# Patient Record
Sex: FEMALE | Race: WHITE | URBAN - METROPOLITAN AREA
[De-identification: names, ages, dates, MRNs, and addresses within clinical notes are randomized per-mention and may not be internally consistent; named-entity substitution may affect disease eponyms.]

---

## 2022-05-27 ENCOUNTER — INPATIENT (INPATIENT)
Facility: HOSPITAL | Age: 62
LOS: 4 days | Discharge: HOME | DRG: 312 | End: 2022-06-01
Attending: HOSPITALIST | Admitting: INTERNAL MEDICINE
Payer: COMMERCIAL

## 2022-05-27 VITALS — WEIGHT: 145.06 LBS | HEIGHT: 66 IN

## 2022-05-27 DIAGNOSIS — N39.0 URINARY TRACT INFECTION, SITE NOT SPECIFIED: ICD-10-CM

## 2022-05-27 DIAGNOSIS — I95.9 HYPOTENSION, UNSPECIFIED: ICD-10-CM

## 2022-05-27 LAB
ALBUMIN SERPL ELPH-MCNC: 3.8 G/DL — SIGNIFICANT CHANGE UP (ref 3.3–5)
ALP SERPL-CCNC: 53 U/L — SIGNIFICANT CHANGE UP (ref 40–120)
ALT FLD-CCNC: 12 U/L — SIGNIFICANT CHANGE UP (ref 12–78)
ANION GAP SERPL CALC-SCNC: 8 MMOL/L — SIGNIFICANT CHANGE UP (ref 5–17)
APPEARANCE UR: CLEAR — SIGNIFICANT CHANGE UP
AST SERPL-CCNC: 12 U/L — LOW (ref 15–37)
BASOPHILS # BLD AUTO: 0.02 K/UL — SIGNIFICANT CHANGE UP (ref 0–0.2)
BASOPHILS NFR BLD AUTO: 0.3 % — SIGNIFICANT CHANGE UP (ref 0–2)
BILIRUB SERPL-MCNC: 0.5 MG/DL — SIGNIFICANT CHANGE UP (ref 0.2–1.2)
BILIRUB UR-MCNC: NEGATIVE — SIGNIFICANT CHANGE UP
BUN SERPL-MCNC: 11 MG/DL — SIGNIFICANT CHANGE UP (ref 7–23)
CALCIUM SERPL-MCNC: 9.1 MG/DL — SIGNIFICANT CHANGE UP (ref 8.5–10.1)
CHLORIDE SERPL-SCNC: 102 MMOL/L — SIGNIFICANT CHANGE UP (ref 96–108)
CO2 SERPL-SCNC: 27 MMOL/L — SIGNIFICANT CHANGE UP (ref 22–31)
COLOR SPEC: YELLOW — SIGNIFICANT CHANGE UP
CREAT SERPL-MCNC: 0.74 MG/DL — SIGNIFICANT CHANGE UP (ref 0.5–1.3)
DIFF PNL FLD: ABNORMAL
EGFR: 92 ML/MIN/1.73M2 — SIGNIFICANT CHANGE UP
EOSINOPHIL # BLD AUTO: 0 K/UL — SIGNIFICANT CHANGE UP (ref 0–0.5)
EOSINOPHIL NFR BLD AUTO: 0 % — SIGNIFICANT CHANGE UP (ref 0–6)
GLUCOSE SERPL-MCNC: 105 MG/DL — HIGH (ref 70–99)
GLUCOSE UR QL: NEGATIVE — SIGNIFICANT CHANGE UP
HCT VFR BLD CALC: 36 % — SIGNIFICANT CHANGE UP (ref 34.5–45)
HGB BLD-MCNC: 11.7 G/DL — SIGNIFICANT CHANGE UP (ref 11.5–15.5)
IMM GRANULOCYTES NFR BLD AUTO: 0.1 % — SIGNIFICANT CHANGE UP (ref 0–1.5)
KETONES UR-MCNC: ABNORMAL
LACTATE SERPL-SCNC: 1.2 MMOL/L — SIGNIFICANT CHANGE UP (ref 0.7–2)
LEUKOCYTE ESTERASE UR-ACNC: ABNORMAL
LYMPHOCYTES # BLD AUTO: 0.94 K/UL — LOW (ref 1–3.3)
LYMPHOCYTES # BLD AUTO: 12.6 % — LOW (ref 13–44)
MCHC RBC-ENTMCNC: 29.1 PG — SIGNIFICANT CHANGE UP (ref 27–34)
MCHC RBC-ENTMCNC: 32.5 GM/DL — SIGNIFICANT CHANGE UP (ref 32–36)
MCV RBC AUTO: 89.6 FL — SIGNIFICANT CHANGE UP (ref 80–100)
MONOCYTES # BLD AUTO: 0.68 K/UL — SIGNIFICANT CHANGE UP (ref 0–0.9)
MONOCYTES NFR BLD AUTO: 9.1 % — SIGNIFICANT CHANGE UP (ref 2–14)
NEUTROPHILS # BLD AUTO: 5.81 K/UL — SIGNIFICANT CHANGE UP (ref 1.8–7.4)
NEUTROPHILS NFR BLD AUTO: 77.9 % — HIGH (ref 43–77)
NITRITE UR-MCNC: NEGATIVE — SIGNIFICANT CHANGE UP
PH UR: 6 — SIGNIFICANT CHANGE UP (ref 5–8)
PLATELET # BLD AUTO: 202 K/UL — SIGNIFICANT CHANGE UP (ref 150–400)
POTASSIUM SERPL-MCNC: 3.9 MMOL/L — SIGNIFICANT CHANGE UP (ref 3.5–5.3)
POTASSIUM SERPL-SCNC: 3.9 MMOL/L — SIGNIFICANT CHANGE UP (ref 3.5–5.3)
PROT SERPL-MCNC: 7.3 GM/DL — SIGNIFICANT CHANGE UP (ref 6–8.3)
PROT UR-MCNC: NEGATIVE — SIGNIFICANT CHANGE UP
RAPID RVP RESULT: SIGNIFICANT CHANGE UP
RBC # BLD: 4.02 M/UL — SIGNIFICANT CHANGE UP (ref 3.8–5.2)
RBC # FLD: 12.8 % — SIGNIFICANT CHANGE UP (ref 10.3–14.5)
SARS-COV-2 RNA SPEC QL NAA+PROBE: SIGNIFICANT CHANGE UP
SODIUM SERPL-SCNC: 137 MMOL/L — SIGNIFICANT CHANGE UP (ref 135–145)
SP GR SPEC: 1 — LOW (ref 1.01–1.02)
TROPONIN I, HIGH SENSITIVITY RESULT: 5.29 NG/L — SIGNIFICANT CHANGE UP
UROBILINOGEN FLD QL: NEGATIVE — SIGNIFICANT CHANGE UP
WBC # BLD: 7.46 K/UL — SIGNIFICANT CHANGE UP (ref 3.8–10.5)
WBC # FLD AUTO: 7.46 K/UL — SIGNIFICANT CHANGE UP (ref 3.8–10.5)

## 2022-05-27 PROCEDURE — 36415 COLL VENOUS BLD VENIPUNCTURE: CPT

## 2022-05-27 PROCEDURE — 80048 BASIC METABOLIC PNL TOTAL CA: CPT

## 2022-05-27 PROCEDURE — 99285 EMERGENCY DEPT VISIT HI MDM: CPT

## 2022-05-27 PROCEDURE — 71045 X-RAY EXAM CHEST 1 VIEW: CPT | Mod: 26

## 2022-05-27 PROCEDURE — 93306 TTE W/DOPPLER COMPLETE: CPT

## 2022-05-27 PROCEDURE — 93010 ELECTROCARDIOGRAM REPORT: CPT

## 2022-05-27 PROCEDURE — 85027 COMPLETE CBC AUTOMATED: CPT

## 2022-05-27 PROCEDURE — 84484 ASSAY OF TROPONIN QUANT: CPT

## 2022-05-27 PROCEDURE — 86803 HEPATITIS C AB TEST: CPT

## 2022-05-27 PROCEDURE — 83735 ASSAY OF MAGNESIUM: CPT

## 2022-05-27 PROCEDURE — 84100 ASSAY OF PHOSPHORUS: CPT

## 2022-05-27 RX ORDER — ACETAMINOPHEN 500 MG
650 TABLET ORAL ONCE
Refills: 0 | Status: COMPLETED | OUTPATIENT
Start: 2022-05-27 | End: 2022-05-27

## 2022-05-27 RX ORDER — SODIUM CHLORIDE 9 MG/ML
1000 INJECTION INTRAMUSCULAR; INTRAVENOUS; SUBCUTANEOUS ONCE
Refills: 0 | Status: COMPLETED | OUTPATIENT
Start: 2022-05-27 | End: 2022-05-27

## 2022-05-27 RX ORDER — ACETAMINOPHEN 500 MG
650 TABLET ORAL EVERY 6 HOURS
Refills: 0 | Status: DISCONTINUED | OUTPATIENT
Start: 2022-05-27 | End: 2022-05-28

## 2022-05-27 RX ORDER — SUMATRIPTAN SUCCINATE 4 MG/.5ML
1 INJECTION, SOLUTION SUBCUTANEOUS
Qty: 0 | Refills: 0 | DISCHARGE

## 2022-05-27 RX ORDER — PIPERACILLIN AND TAZOBACTAM 4; .5 G/20ML; G/20ML
3.38 INJECTION, POWDER, LYOPHILIZED, FOR SOLUTION INTRAVENOUS ONCE
Refills: 0 | Status: COMPLETED | OUTPATIENT
Start: 2022-05-27 | End: 2022-05-27

## 2022-05-27 RX ORDER — LANOLIN ALCOHOL/MO/W.PET/CERES
0.5 CREAM (GRAM) TOPICAL
Qty: 0 | Refills: 0 | DISCHARGE

## 2022-05-27 RX ORDER — VANCOMYCIN HCL 1 G
750 VIAL (EA) INTRAVENOUS ONCE
Refills: 0 | Status: COMPLETED | OUTPATIENT
Start: 2022-05-27 | End: 2022-05-27

## 2022-05-27 RX ADMIN — SODIUM CHLORIDE 1000 MILLILITER(S): 9 INJECTION INTRAMUSCULAR; INTRAVENOUS; SUBCUTANEOUS at 19:51

## 2022-05-27 RX ADMIN — Medication 650 MILLIGRAM(S): at 21:43

## 2022-05-27 RX ADMIN — PIPERACILLIN AND TAZOBACTAM 200 GRAM(S): 4; .5 INJECTION, POWDER, LYOPHILIZED, FOR SOLUTION INTRAVENOUS at 21:39

## 2022-05-27 RX ADMIN — Medication 250 MILLIGRAM(S): at 22:13

## 2022-05-27 NOTE — ED STATDOCS - CLINICAL SUMMARY MEDICAL DECISION MAKING FREE TEXT BOX
Continue Flecainide, Metoprolol  Hold Coumadin for possible ERCP, EUS, Biopsy Here with likely UTI. Check basic labs, hydrate, get UA, treat fever, reeval.

## 2022-05-27 NOTE — ED STATDOCS - MUSCULOSKELETAL, MLM
range of motion is not limited and there is no muscle tenderness 5/5 Strength on flexion and extension of all lizeth. .

## 2022-05-27 NOTE — ED STATDOCS - ATTENDING APP SHARED VISIT CONTRIBUTION OF CARE
I Mauricio Contreras MD saw and examined the patient. MLP saw and examined the patient under my supervision. I discussed the care of the patient with MLP and agree with MLP's plan, assessment and care of the patient while in the ED.

## 2022-05-27 NOTE — ED STATDOCS - NS_ ATTENDINGSCRIBEDETAILS _ED_A_ED_FT
I Mauricio Contreras MD saw and examined the patient. Scribe documented for me and under my supervision. I have modified the scribe's documentation where necessary to reflect my history, physical exam and other relevant documentations pertinent to the care of the patient.

## 2022-05-27 NOTE — ED ADULT TRIAGE NOTE - CHIEF COMPLAINT QUOTE
pt presents to ed ambulatory for evaluation of vaginal pain and "rawness" , dysuria, and pressure when urinating. in addition, pt reports feeling fatigue and chills with syncopal episode this morning at work. pt has no other complaints

## 2022-05-27 NOTE — ED STATDOCS - NSICDXPASTSURGICALHX_GEN_ALL_CORE_FT
PAST SURGICAL HISTORY:  S/P appendectomy 1976    S/P  Section  and     S/P mastectomy, bilateral 2009 with silicone implants

## 2022-05-27 NOTE — ED ADULT NURSE NOTE - OBJECTIVE STATEMENT
60 y/o female pt. ambulatory to ED c/o syncope and hypotension today at work. pt. states this has happened once before in the past. pt. is now normotensive in triage. pt. also reports rash, pain and burning in the vaginal area. pt. reports this has been occurring over a week and has gotten progressively worse.

## 2022-05-27 NOTE — ED STATDOCS - PHYSICAL EXAMINATION
Vital signs as available reviewed.  General:  No acute distress.  Head:  Normocephalic, atraumatic.  Eyes:  Conjunctiva pink, no icterus.  Cardiovascular:  Regular rate, no obvious murmur.  Respiratory:  Clear to auscultation, good air entry bilaterally.  Abdomen:  Soft, non-tender, no CVA ttp   Musculoskeletal:  No obvious deformity.  Neurologic: Alert and oriented, moving all extremities.  Skin:  Warm and dry.

## 2022-05-27 NOTE — ED STATDOCS - NS ED ROS FT
Constitutional: fever, lightheadedness   Neurological: No reported acute headache.  Eyes: No reported new vision changes.   Ears, Nose, Mouth, Throat: No reported acute sore throat.  Cardiovascular: No reported current chest pain. +syncope   Respiratory: No reported new shortness of breath.  Gastrointestinal: No reported vomiting.  Genitourinary: +dysuria   Musculoskeletal: No reported acute extremity pain.  Integumentary (skin and/or breast): No reported new rash.

## 2022-05-27 NOTE — PHARMACOTHERAPY INTERVENTION NOTE - COMMENTS
Medication reconciliation completed.  Reviewed Medication list and confirmed med allergies with patient; confirmed with Dr. First Medtroy.

## 2022-05-27 NOTE — ED STATDOCS - NS ED ATTENDING STATEMENT MOD
This was a shared visit with the NATE. I reviewed and verified the documentation and independently performed the documented:

## 2022-05-27 NOTE — ED STATDOCS - CARE PLAN
1 Principal Discharge DX:	Pyelonephritis  Secondary Diagnosis:	Fever   Principal Discharge DX:	Hypotension  Secondary Diagnosis:	Fever

## 2022-05-27 NOTE — ED STATDOCS - NSICDXPASTMEDICALHX_GEN_ALL_CORE_FT
PAST MEDICAL HISTORY:  BRCA Gene Positive     Breast cancer     Lipoma of abdominal wall     Migraine headache

## 2022-05-27 NOTE — ED STATDOCS - PROGRESS NOTE DETAILS
pt here with c/c of burning with urination , urinary frequency and urgency and had a syncopal episode at work today, pt denies any loc, head trauma and states she did not eat all day, she was found to be hypotensive and that has now improved.  pt PE wnl plan: labs, ivfs, ekg, hcg and reeval. -Lorri Tapia PA-C pt here with c/c of burning with urination , urinary frequency and urgency and had a syncopal episode at work today, pt denies any loc, head trauma and states she did not eat all day, she was found to be hypotensive and that has now improved.  pt PE wnl plan: labs, ivfs, ekg, and reeval. -Lorri Tapia PA-C pt reeval and aware of results and agrees to admission, called hospitalist and left message awaiting call back for admission.  -Lorri Tapia PA-C

## 2022-05-27 NOTE — ED STATDOCS - OBJECTIVE STATEMENT
60 yo female w/ no pertinent PMHx presents to the ED c/o syncope. Pt states that she had low blood pressure this morning and has a syncopal episode. According to the , pt has had a similar episode in the past. Pt states that she felt like she knew she was going to synopsize. Denies CP, SOB, palpitations. Pt c/o dysuria. Denies vaginal discharge. Pt has fever, lightheadedness. Denies nausea. Allergic to Dilaudid. 62 yo female w/ no pertinent PMHx presents to the ED c/o syncope. Pt states that she had low blood pressure this morning and has a syncopal episode. According to the , pt has had a similar episode in the past. Pt states that she felt like she knew she was going to synopsize. Denies CP, SOB, palpitations. Pt c/o dysuria. Denies vaginal discharge. Pt has fever, lightheadedness. Denies nausea. Allergic to Dilaudid. No visual complaints. No saddle anesthesia. No fever or chills. No skin rash No neck stiffness. No vertigo. no IVDU, or ETOH abuse. No trauma. No vaginal bleeding or dc. No melena or hematochezia.

## 2022-05-28 PROCEDURE — 99222 1ST HOSP IP/OBS MODERATE 55: CPT

## 2022-05-28 RX ORDER — ENOXAPARIN SODIUM 100 MG/ML
40 INJECTION SUBCUTANEOUS EVERY 24 HOURS
Refills: 0 | Status: DISCONTINUED | OUTPATIENT
Start: 2022-05-28 | End: 2022-06-01

## 2022-05-28 RX ORDER — INFLUENZA VIRUS VACCINE 15; 15; 15; 15 UG/.5ML; UG/.5ML; UG/.5ML; UG/.5ML
0.5 SUSPENSION INTRAMUSCULAR ONCE
Refills: 0 | Status: DISCONTINUED | OUTPATIENT
Start: 2022-05-28 | End: 2022-06-01

## 2022-05-28 RX ORDER — ACETAMINOPHEN 500 MG
650 TABLET ORAL EVERY 6 HOURS
Refills: 0 | Status: DISCONTINUED | OUTPATIENT
Start: 2022-05-28 | End: 2022-05-30

## 2022-05-28 RX ORDER — PHENAZOPYRIDINE HCL 100 MG
200 TABLET ORAL THREE TIMES A DAY
Refills: 0 | Status: ACTIVE | OUTPATIENT
Start: 2022-05-28 | End: 2023-04-26

## 2022-05-28 RX ORDER — CEFTRIAXONE 500 MG/1
1000 INJECTION, POWDER, FOR SOLUTION INTRAMUSCULAR; INTRAVENOUS EVERY 24 HOURS
Refills: 0 | Status: COMPLETED | OUTPATIENT
Start: 2022-05-28 | End: 2022-05-30

## 2022-05-28 RX ADMIN — CEFTRIAXONE 100 MILLIGRAM(S): 500 INJECTION, POWDER, FOR SOLUTION INTRAMUSCULAR; INTRAVENOUS at 09:30

## 2022-05-28 RX ADMIN — Medication 1 APPLICATION(S): at 19:29

## 2022-05-28 RX ADMIN — Medication 650 MILLIGRAM(S): at 18:00

## 2022-05-28 RX ADMIN — ENOXAPARIN SODIUM 40 MILLIGRAM(S): 100 INJECTION SUBCUTANEOUS at 09:31

## 2022-05-28 RX ADMIN — Medication 200 MILLIGRAM(S): at 01:16

## 2022-05-28 RX ADMIN — Medication 650 MILLIGRAM(S): at 09:32

## 2022-05-28 NOTE — H&P ADULT - NSHPPHYSICALEXAM_GEN_ALL_CORE
Vitals:  T(F): 98.7 (28 May 2022 00:37), Max: 102.8 (27 May 2022 18:29)  HR: 71 (28 May 2022 00:37) (71 - 97)  BP: 101/54 (28 May 2022 00:37) (101/54 - 130/82)  RR: 18 (28 May 2022 00:37) (17 - 18)  SpO2: 99% (28 May 2022 00:37) (97% - 99%) Vitals:  T(F): 98.7 (28 May 2022 00:37), Max: 102.8 (27 May 2022 18:29)  HR: 71 (28 May 2022 00:37) (71 - 97)  BP: 101/54 (28 May 2022 00:37) (101/54 - 130/82)  RR: 18 (28 May 2022 00:37) (17 - 18)  SpO2: 99% (28 May 2022 00:37) (97% - 99%)    Gen: No acute distress  HEENT: NCAT PERRL EOMI clear oropharynx  Neck: Supple, no JVD  Chest: Normal resp effort at rest, lungs CTA B/L  CVS: S1 S2 normal RRR  Abd: Soft NT ND +BS  Ext: No edema or calf tenderness  Skin: Warm, dry, intact  Neuro: AOx3, no gross deficits  Mood: Calm, pleasant Vitals:  T(F): 98.7 (28 May 2022 00:37), Max: 102.8 (27 May 2022 18:29)  HR: 71 (28 May 2022 00:37) (71 - 97)  BP: 101/54 (28 May 2022 00:37) (101/54 - 130/82)  RR: 18 (28 May 2022 00:37) (17 - 18)  SpO2: 99% (28 May 2022 00:37) (97% - 99%)    Gen: No acute distress  HEENT: NCAT PERRL EOMI clear oropharynx  Neck: Supple, no JVD  Chest: Normal resp effort at rest, lungs CTA B/L  CVS: S1 S2 normal RRR  Abd: Soft NT ND +BS  GYN exam: (As per GYN Consult from this afternoon) External genitalia w/ coalescing hypopigmented papules on the external labial bilaterally with similar hypopigmented papules on the bilateral inner thighs. Exquisite tenderness on digital exam, unable to advance pass the introitus 2/2 pain / atrophy. No unusual discharge or vaginal bleeding noted   Ext: No edema or calf tenderness  Skin: Warm, dry, intact  Neuro: AOx3, no gross deficits  Mood: Calm, pleasant

## 2022-05-28 NOTE — H&P ADULT - HISTORY OF PRESENT ILLNESS
Chief Complaint: Dysuria, episode of syncope    HPI:  Chief Complaint: Dysuria, episode of syncope    HPI: 61 year old woman with hx of migraine headaches, breast cancer, BRCA mutation, here for further evaluation after an episode of syncope, also requesting evaluation of severe vaginal pain and pruritis, ongoing for over a week. Rare whitish discharge. No fever. No new sexual partners. She does have some mild dysuria. No hematuria. No flank pain. No other complaints. No fever or rigors. No chest complaints.

## 2022-05-28 NOTE — H&P ADULT - ASSESSMENT
62 yo woman with hx of migraine headaches, breast cancer, BRCA gene+, presented to hospital after an episode of syncope. ROS notable for dysuria. UA with bacteriuria. Started on empiric antibiotics, given IV fluid 2L, and admitted to Medicine.     Syncope  Possible orthostatic or vasovagal syncope. Less likely cardiogenic or neurogenic. S/P IV fluid hydration in ED.   - Check orthostatics  - Monitor on tele    Suspected UTI  Patient with dysuria on presentation. No fever. No rigors. No flank pain. UA with bacteriuria.   - On empiric ceftriaxone  - F/u in process Ucx Bld cx       62 yo woman with hx of migraine headaches, breast cancer, BRCA gene+, presented to hospital after an episode of syncope. ROS notable for dysuria. UA with bacteriuria. Started on empiric antibiotics, given IV fluid 2L, and admitted to Medicine.     Syncope  Possible orthostatic or vasovagal syncope. Less likely cardiogenic or neurogenic. S/P IV fluid hydration in ED.   - Check orthostatics  - Monitor on tele    Suspected UTI  Patient with dysuria on presentation. No fever. No rigors. No flank pain. UA with bacteriuria.   - On empiric ceftriaxone  - F/u in process Ucx Bld cx    Vaginal pruritis and pain  Appreciate input from Gyn. Physical exam findings suspicious for lichen sclerosis. Ordered for clobetasol propionate nightly x 6-12 weeks. Patient counselled to establish gyn care for continued surveillance and further outpatient work-up.   - Continue clobetasol  - Outpatient GYN follow up

## 2022-05-28 NOTE — CONSULT NOTE ADULT - ASSESSMENT
ROMULO CHU is a 60yo   with LMP 10 yrs ago admitted to medicine with syncope and UTI who c/o vaginal itching and discomfort worsening over the last 3 days.    ROMULO CHU is a 62yo   with LMP 10 yrs ago admitted to medicine with syncope and UTI who c/o vaginal itching and discomfort worsening over the last 3 days.   Physical exam findings suspicious for lichen sclerosis. Will treat with clobetasol propionate- nightly x 6-12 weeks  Pt counselled to establish gyn care for continued surveillance and further outpatient work-up.     No further gyn interventions warranted at this time. Will sign off. Reconsult as medically indicated.     Discussed with Dr. Araujo

## 2022-05-28 NOTE — CONSULT NOTE ADULT - SUBJECTIVE AND OBJECTIVE BOX
ROMULO CHU is a 62yo   with LMP 10 yrs ago admitted to medicine with syncope and UTI who c/o vaginal itching and discomfort worsening over the last 3 days. She reports being followed by dermatology for a skin condition on her labia that she cannot recall, however has not seen a gyn in over 6 years. She denies any vaginal bleeding, but notes occasional creamy white discharge.     ROS:  Gen: no fatigue  CV: no chest pain  Resp: no SOB, wheezing  Neuro: no vision change, headache  GI: no abdominal pain, diarrhea, constipation  : no dysuria, increased frequency, urge  Gyn: no vaginal bleeding, abnormal discharge  ID: no fevers, chills  Int: no rash  MSK: no weakness    PAST MEDICAL & SURGICAL HISTORY:  Breast cancer  Migraine headache  BRCA Gene Positive  Lipoma of abdominal wall  S/P mastectomy, bilateral  2009 with silicone implants  S/P appendectomy   S/P  Section  and     ObHx: C/s x2- triplets  and kim   GynHx:       no h/o abnormal Paps   denies h/o fibroids, cysts, STIs    SocHx: Denies toxic x3    Allergies: Dilaudid     Meds:  acetaminophen     Tablet .. 650 milliGRAM(s) Oral every 6 hours PRN  cefTRIAXone   IVPB 1000 milliGRAM(s) IV Intermittent every 24 hours  enoxaparin Injectable 40 milliGRAM(s) SubCutaneous every 24 hours  influenza   Vaccine 0.5 milliLiter(s) IntraMuscular once  phenazopyridine 200 milliGRAM(s) Oral three times a day PRN  Home Medications:  Imitrex 100 mg oral tablet: 1 tab(s) orally once, As Needed (27 May 2022 22:20)  Melatonin 12 mg oral tablet, disintegratin.5 tab(s) orally once a day (at bedtime), As Needed (27 May 2022 22:20)    Height (cm): 167.6 (22 @ 16:47)  Weight (kg): 56.699 (22 @ 16:52)  BMI (kg/m2): 20.2 (22 @ 16:52)  BSA (m2): 1.64 (22 @ 16:52)  T(C): 38.9 (22 @ 08:03), Max: 39.3 (22 @ 18:29)  HR: 92 (22 @ 08:03) (71 - 97)  BP: 120/70 (22 @ 08:03) (101/54 - 130/82)  RR: 18 (22 @ 08:03) (17 - 18)  SpO2: 95% (22 @ 08:03) (95% - 99%)    Physical Exam:  Gen: alert oriented no acute distress  HEENT: atraumatic, normocephalic  CV: Regular rate rhythm  Pulm: clear to auscultation bilaterally  Abd: soft non-tender, no surgical incisions,+ BS  : no CVA tenderness  Gyn:    external genitalia: coalescing white plaques and papules on the external labial bilaterally- with similar plaques on the bilateral inner thighs.     Exquisite tenderness on digital exam- unable to advance pass the introitus 2/2 pain/atrophy. No unusual discharge or vaginal bleeding noted                             11.7   7.46  )-----------( 202      ( 27 May 2022 19:38 )             36.0           137  |  102  |  11  ----------------------------<  105<H>  3.9   |  27  |  0.74    Ca    9.1      27 May 2022 19:38    TPro  7.3  /  Alb  3.8  /  TBili  0.5  /  DBili  x   /  AST  12<L>  /  ALT  12  /  AlkPhos  53      LIVER FUNCTIONS - ( 27 May 2022 19:38 )  Alb: 3.8 g/dL / Pro: 7.3 gm/dL / ALK PHOS: 53 U/L / ALT: 12 U/L / AST: 12 U/L / GGT: x           Urinalysis Basic - ( 27 May 2022 19:38 )    Color: Yellow / Appearance: Clear / S.005 / pH: x  Gluc: x / Ketone: Trace  / Bili: Negative / Urobili: Negative   Blood: x / Protein: Negative / Nitrite: Negative   Leuk Esterase: Moderate / RBC: 0-2 /HPF / WBC 3-5   Sq Epi: x / Non Sq Epi: Occasional / Bacteria: Occasional         ROMULO CHU is a 62yo   with LMP 10 yrs ago admitted to medicine with syncope and UTI who c/o vaginal itching and discomfort worsening over the last 3 days. She reports being followed by dermatology for a skin condition on her labia that she cannot recall, however has not seen a gyn in over 6 years. She denies any vaginal bleeding, but notes occasional creamy white discharge.     ROS:  Gen: no fatigue  CV: no chest pain  Resp: no SOB, wheezing  Neuro: no vision change, headache  GI: no abdominal pain, diarrhea, constipation  : no dysuria, increased frequency, urge  Gyn: no vaginal bleeding, abnormal discharge  ID: no fevers, chills  Int: no rash  MSK: no weakness    PAST MEDICAL & SURGICAL HISTORY:  Breast cancer  Migraine headache  BRCA Gene Positive  Lipoma of abdominal wall  S/P mastectomy, bilateral  2009 with silicone implants  S/P appendectomy   S/P  Section  and     ObHx: C/s x2- triplets  and kim   GynHx:       no h/o abnormal Paps   denies h/o fibroids, cysts, STIs    SocHx: Denies toxic x3    Allergies: Dilaudid     Meds:  acetaminophen     Tablet .. 650 milliGRAM(s) Oral every 6 hours PRN  cefTRIAXone   IVPB 1000 milliGRAM(s) IV Intermittent every 24 hours  enoxaparin Injectable 40 milliGRAM(s) SubCutaneous every 24 hours  influenza   Vaccine 0.5 milliLiter(s) IntraMuscular once  phenazopyridine 200 milliGRAM(s) Oral three times a day PRN  Home Medications:  Imitrex 100 mg oral tablet: 1 tab(s) orally once, As Needed (27 May 2022 22:20)  Melatonin 12 mg oral tablet, disintegratin.5 tab(s) orally once a day (at bedtime), As Needed (27 May 2022 22:20)    Height (cm): 167.6 (22 @ 16:47)  Weight (kg): 56.699 (22 @ 16:52)  BMI (kg/m2): 20.2 (22 @ 16:52)  BSA (m2): 1.64 (22 @ 16:52)  T(C): 38.9 (22 @ 08:03), Max: 39.3 (22 @ 18:29)  HR: 92 (22 @ 08:03) (71 - 97)  BP: 120/70 (22 @ 08:03) (101/54 - 130/82)  RR: 18 (22 @ 08:03) (17 - 18)  SpO2: 95% (22 @ 08:03) (95% - 99%)    Physical Exam:  Gen: alert oriented no acute distress  HEENT: atraumatic, normocephalic  CV: Regular rate rhythm  Pulm: clear to auscultation bilaterally  Abd: soft non-tender, no surgical incisions,+ BS  : no CVA tenderness  Gyn:    external genitalia: coalescing hypopigmented papules on the external labial bilaterally- with similar hypopigmented papules on the bilateral inner thighs.     Exquisite tenderness on digital exam- unable to advance pass the introitus 2/2 pain/atrophy. No unusual discharge or vaginal bleeding noted                             11.7   7.46  )-----------( 202      ( 27 May 2022 19:38 )             36.0           137  |  102  |  11  ----------------------------<  105<H>  3.9   |  27  |  0.74    Ca    9.1      27 May 2022 19:38    TPro  7.3  /  Alb  3.8  /  TBili  0.5  /  DBili  x   /  AST  12<L>  /  ALT  12  /  AlkPhos  53      LIVER FUNCTIONS - ( 27 May 2022 19:38 )  Alb: 3.8 g/dL / Pro: 7.3 gm/dL / ALK PHOS: 53 U/L / ALT: 12 U/L / AST: 12 U/L / GGT: x           Urinalysis Basic - ( 27 May 2022 19:38 )    Color: Yellow / Appearance: Clear / S.005 / pH: x  Gluc: x / Ketone: Trace  / Bili: Negative / Urobili: Negative   Blood: x / Protein: Negative / Nitrite: Negative   Leuk Esterase: Moderate / RBC: 0-2 /HPF / WBC 3-5   Sq Epi: x / Non Sq Epi: Occasional / Bacteria: Occasional

## 2022-05-28 NOTE — H&P ADULT - NSHPLABSRESULTS_GEN_ALL_CORE
Labs:  --------               11.7   7.46 )-----------( 202              36.0       137  |  102  |  11  -----------------------<  105  3.9   |  27  |  0.74    Ca    9.1      27 May 2022 19:38    TPro  7.3  /  Alb  3.8  /  TBili  0.5  /  DBili  x   /  AST  12  /  ALT  12  /  AlkPhos  53      Lactate, Blood 1.2 mmol/L    Troponin (-)    Urinalysis Basic - ( 27 May 2022 19:38 )  Color: Yellow / Appearance: Clear / S.005 / pH: x  Gluc: x / Ketone: Trace  / Bili: Negative / Urobili: Negative   Blood: Trace / Protein: Negative / Nitrite: Negative   Leuk Esterase: Moderate / RBC: 0-2 /HPF / WBC 3-5   Sq Epi: x / Non Sq Epi: Occasional / Bacteria: Occasional      Micro:  --------  Urine culture in process  Blood culture in process  COVID19 PCR negative      Imaging:  -------------  CXR performed, report pending. Grossly clear lungs. Normal cardiac silhouette.       Cardiac Testing:  -------------------  EKG: Reviewed. No acute ischemic changes. No dysrhythmia.

## 2022-05-28 NOTE — PATIENT PROFILE ADULT - FALL HARM RISK - HARM RISK INTERVENTIONS

## 2022-05-28 NOTE — PATIENT PROFILE ADULT - NSPROPOAURINARYCATHETER_GEN_A_NUR
Assessment  1  Pulmonary emphysema, unspecified emphysema type (492 8) (J43 9)   2  Solitary pulmonary nodule (793 11) (R91 1)    Plan  Chronic obstructive pulmonary disease    · Anoro Ellipta 62 5-25 MCG/INH Inhalation Aerosol Powder Breath Activated   Rx By: Eros Dumont; Dispense: 90 Days ; #:3 Aerosol Powder Breath Activated; Refill: 3;Chronic obstructive pulmonary disease; ZAK = N; Sent To: [facility]/PHARMACY #[id number] Last Updated By: Christiana Dixon; 12/6/2017 9:25:09 AM  Pulmonary emphysema, unspecified emphysema type    · Spiriva Respimat 2 5 MCG/ACT Inhalation Aerosol Solution; INHALE 2 PUFFSONCE DAILY   Rx By: Christiana Dixon; Dispense: 0 Days ; #:1 X 4 GM Inhaler; Refill: 11;Pulmonary emphysema, unspecified emphysema type; ZAK = N; Verified Transmission to [facility]/PHARMACY #[id number] Last Updated By: System, [employer]; 12/6/2017 9:27:28 AM   · Follow-up visit in 4 Months Evaluation and Treatment  Follow-up  Status: Hold For -Scheduling  Requested for: 36FFU2889   Ordered; For: Pulmonary emphysema, unspecified emphysema type; Ordered By: Christiana Dixon Performed:  Due: 49ESH6319   · Complete PFT with DLCO; Status:Hold For - Scheduling; Requested for:84Ujk0740;    Perform:[facility]; Due:23Qqs7189; Ordered;emphysema, unspecified emphysema type; Ordered By:Cole Rousseau; Solitary pulmonary nodule    · * CT CHEST WO CONTRAST; Status:Need Information - Financial Authorization; Requested for:01Apr2018; Perform:[facility]; FBJ:[id number];LMHFQHE;JSRHAYTOD nodule; Ordered By:Cole Rousseau; Results/Data  PFT Results v2:     Spirometry:   Post Bronchodilator Spirometry:   Lung Volumes:   DLCO:   PFT Interpretation:  None available   (1) CBC/PLT/DIFF 31Oct2017 09:09AM Eros Dumont     Test Name Result Flag Reference   WHITE BLOOD CELL COUNT 7 3 Thousand/uL  3 8-10 8   RED BLOOD CELL COUNT 5 29 Million/uL H 3 80-5 10   HEMOGLOBIN 15 9 g/dL H 11 7-15 5   HEMATOCRIT 46 8 % H 35 0-45 0   MCV 88 5 fL 80 0-100 0   MCH 30 1 pg  27 0-33 0   MCHC 34 0 g/dL  32 0-36 0   RDW 12 6 %  11 0-15 0   PLATELET COUNT 339 Thousand/uL  140-400   ABSOLUTE NEUTROPHILS 4453 cells/uL  9421-0529   ABSOLUTE LYMPHOCYTES 1993 cells/uL  850-3900   ABSOLUTE MONOCYTES 621 cells/uL  200-950   ABSOLUTE EOSINOPHILS 183 cells/uL     ABSOLUTE BASOPHILS 51 cells/uL  0-200   NEUTROPHILS 61 %     LYMPHOCYTES 27 3 %     MONOCYTES 8 5 %     EOSINOPHILS 2 5 %     BASOPHILS 0 7 %     MPV 13 1 fL H 7 5-12 5     (1) COMPREHENSIVE METABOLIC PANEL [id number] 45:16NT Alba Strickland     Test Name Result Flag Reference   GLUCOSE 94 mg/dL  65-99   Fasting reference interval   UREA NITROGEN (BUN) 14 mg/dL  7-25   CREATININE 0 92 mg/dL  0 50-1 05     For patients >52years of age, the reference limit for Creatinine is approximately 13% higher for people identified as -American  eGFR NON-AFR  AMERICAN 69 mL/min/1 73m2  > OR = 60   eGFR AFRICAN AMERICAN 80 mL/min/1 73m2  > OR = 60   BUN/CREATININE RATIO   1-61   NOT APPLICABLE (calc)   SODIUM 138 mmol/L  135-146   POTASSIUM 4 0 mmol/L  3 5-5 3   CHLORIDE 103 mmol/L     CARBON DIOXIDE 28 mmol/L  20-31   CALCIUM 8 8 mg/dL  8 6-10 4   PROTEIN, TOTAL 6 4 g/dL  6 1-8 1   ALBUMIN 3 9 g/dL  3 6-5 1   GLOBULIN 2 5 g/dL (calc)  1 9-3 7   ALBUMIN/GLOBULIN RATIO 1 6 (calc)  1 0-2 5   BILIRUBIN, TOTAL 0 4 mg/dL  0 2-1 2   ALKALINE PHOSPHATASE 142 U/L H    AST 31 U/L  10-35   ALT 43 U/L H 6-29     * CT CHEST WO CONTRAST [id number] 10:46AM Alba Marco A      Order Number: [id number]   - Patient Instructions: To schedule this appointment, please contact Central Scheduling at [phone]  Test Name Result Flag Reference   CT CHEST WO CONTRAST (Report)       This is a summary report  The complete report is available in the patient's medical record  If you cannot access the medical record, please contact the sending organization for a detailed fax or copy     CT CHEST WITHOUT IV CONTRAST   INDICATION: COPD, cough  Familial history of lung cancer  COMPARISON: Chest film 11/12/2014   TECHNIQUE: CT examination of the chest was performed without intravenous contrast  Reformatted images were created in axial, sagittal, and coronal planes  Radiation dose length product (DLP) for this visit: 240 38 mGy-cm   This examination, like all CT scans performed in the [facility], was performed utilizing techniques to minimize radiation dose exposure, including the use of iterative  reconstruction and automated exposure control  FINDINGS: The study is limited without IV contrast    LUNGS:   8 x 7 x 8 mm left upper lobe nodule  On the coronal images there appears to be central low density (near fat), suggestive of a hamartoma  The margins are not significantly irregular  Mild centrilobular emphysema  Suspected prominent vascular confluence right upper lobe series 2/15  Subsegmental atelectasis noted in the lower lingula  No consolidation or endobronchial lesions  Focal pleural thickening, intrafissural lymph node is seen along the right minor fissure  PLEURA: Unremarkable  HEART/GREAT VESSELS: Unremarkable for patient's age  Trace pericardial fluid  MEDIASTINUM AND KIANNA: Unremarkable  CHEST WALL AND LOWER NECK:  Slightly heterogeneous attenuation of the thyroid with calcification on the left  VISUALIZED STRUCTURES IN THE UPPER ABDOMEN: 18 mm probable right adrenal adenoma  OSSEOUS STRUCTURES: No acute fracture  No destructive osseous lesion  Mild degenerative changes of the spine  IMPRESSION:   8 mm left upper lobe nodule with central low density, favoring hamartoma  I would recommend follow-up CT chest at 6 months to ensure stability  Mild COPD  18 mm probable right adrenal adenoma  Findings were personally telephoned to Dr Jm Sands approximately 1435 hrs on 10/18/2017      ##fuslh3##fuslh3    Workstation performed: [id number]   Signed by:  Belgica Leiva DO  10/19/17  RAD_DOSE  Modality Radiation Exposure Data   Order Radiation   Type Dose Range   Radiation Dose 240 38 mGy-cm 0 - 6000 mGy-cm       Discussion/Summary  Discussion Summary:   Emphysema with probable obstruction on PFTs: Will obtain full pulmonary function testing to assess for degree of obstruction  I have asked her to stop using Anoro as she is not using an effective dose  We will go back to Spiriva Respimat 2 sprays once daily  She will continue to use [employer] and albuterol as needed  will increase her activity level as tolerated  discussed at length smoking cessation  She will go back on the patches, her  is agreeable to quit smoking tomorrow, she will download a smoking cessation donaldo on her phone  nodule-seems consistent with a hamartoma per Radiology  We will do repeat CT scan in April to assess for any change in size  will see her back in 4 months after CT scan or sooner if pulmonary complications arise  Goals and Barriers: The patient has the current Goals: Stop smoking  The patent has the current Barriers: Multiple family members who smoke  Patient's Capacity to Self-Care: Patient is able to Self-Care  Medication SE Review and Pt Understands Tx: Possible side effects of new medications were reviewed with the patient/guardian today  The treatment plan was reviewed with the patient/guardian   The patient/guardian understands and agrees with the treatment plan   Self Referrals:   Self Referrals: No      Active Problems     · Abdominal pain (789 00) (R10 9)   · Arteriosclerotic heart disease (414 00) (I25 10)   · DDD (degenerative disc disease), lumbar (722 52) (M51 36)   · Depression with anxiety (300 4) (F41 8)   · Diverticulosis of colon (562 10) (K57 30)   · Esophageal reflux (530 81) (K21 9)   · Gallbladder polyp (575 6) (K82 4)   · History of Prinzmetal angina (V12 59) (Z86 79)   · Hyperlipidemia (272 4) (E78 5)   · Hypertension (401 9) (I10)   · Low back pain (724 2) (M54 5)   · Myofascial pain syndrome, cervical (729 1) (M79 1)   · Osteopenia (733 90) (M85 80)   · Vitamin D deficiency (268 9) (E55 9)    Chief Complaint  Chief Complaint Free Text Note Form: spot on my lung      History of Present Illness  HPI: The patient is a 27-year-old smoker who was diagnosed with emphysema approximately 5 years ago and had a CT scan of her chest in October which showed a left upper lobe nodule concerning for hamartoma  tells me that she her shortness of breath when climbing a flight of stairs or sometimes simply laughing  She denies any associated chest pain  She has a chronic daily cough that is sometimes productive of yellow mucus  She denies any hemoptysis  Sometimes she will have some wheezing  is currently supposed to be taking Anoro once daily but states she only uses it approximately twice a week because it dries out her throat and makes her lungs feel like âmetal â  In the past she has been on Spiriva and Advair without significant side effects  She uses ProAir rescue inhaler approximately once a day  She use an albuterol nebulizer approximately twice a week  continues to smoke approximately 1 pack of cigarettes daily and has been doing so for 48 years  She has tried Chantix in the past but caused significant stomach upset  She has used Wellbutrin and patches for which she was able to quit for 1 week but unfortunately she went back to smoking  She has no significant occupational lung exposure  has had a pneumococcal and flu vaccine this year  Review of Systems  Complete-Female - Pulm:  Constitutional: feeling tired, but-- no recent weight loss  Eyes: no complaints of vision problems  ENT: sore throat-- and-- nasal discharge  Cardiovascular: no palpitations, no chest pain  Respiratory: as noted in HPI  Gastrointestinal: abdominal pain, but-- as noted in HPI-- and-- no nausea  Genitourinary: no dysuria  Musculoskeletal: arthralgias-- and-- Leg cramps    Integumentary: no rash, no lesions  Neurological: no headache, no fainting, no weakness  Psychiatric: sleep disturbances  Hematologic/Lymphatic: â no complaints of swollen glands  Past Medical History  1  History of constipation (V12 79) (Z87 19)   2  History of hypercholesterolemia (V12 29) (Z86 39)   3  History of migraine (V12 49) (Z86 69)   4  History of Prinzmetal angina (V12 59) (Z86 79)   5  History of sciatica (V12 49) (Z86 69)   6  History of vertigo (V12 49) (Z87 898)   7  History of Iliotibial band tendonitis (728 89) (M76 30)  Active Problems And Past Medical History Reviewed: The active problems and past medical history were reviewed and updated today  Surgical History  1  History of Complete Colonoscopy   2  History of Diagnostic Esophagogastroduodenoscopy   3  History of Flexor Tendon Repair (Each)   4  History of Shoulder Surgery   5  History of Total Abdominal Hysterectomy With Removal Of Both Ovaries  Surgical History Reviewed: The surgical history was reviewed and updated today  Family History  Mother    1  Family history of lung cancer (V16 1) (Z80 1)  Father    2  Family history of Dyslipidemia  Sister    3  Family history of Diabetes Mellitus (V18 0)  Family History    4  Family history of hypertension (V17 49) (Z82 49)  Family History Reviewed: The family history was reviewed and updated today  Social History     · Current every day smoker (305 1) (F17 200)   · 1 PPD 48 years   · Marital History - Currently    · No alcohol use   · No drug use   · Working Full Time  Social History Reviewed: The social history was reviewed and updated today  Current Meds   1  Albuterol Sulfate (2 5 MG/3ML) 0 083% Inhalation Nebulization Solution; USE ONE VIAL IN NEBULIZER EVERY 4 TO 6 HOURS AS NEEDED FOR  COUGH  ANDWHEEZE; Therapy: 59QIT5802 to (Evaluate:72Nvt1366)  Requested for: 81HGJ5518; Last Rx:10Nov2014 Ordered   2   Anoro Ellipta 62 5-25 MCG/INH Inhalation Aerosol Powder Breath Activated; USE ONE (1) PUFFS ONCE DAILY; Therapy: 13WLQ1932 to (James Rita)  Requested for: 72Fiq2698; Last Rx:02Rkb5996 Ordered   3  BuPROPion HCl ER (XL) 150 MG Oral Tablet Extended Release 24 Hour; take 1 tablet every day; Therapy: 22MEY7921 to (96 290919)  Requested for: 99YQW1261; Last Rx:02Nov2017 Ordered   4  Cyclobenzaprine HCl - 10 MG Oral Tablet; TAKE 1 TABLET Bedtime PRN muscle spasms; Therapy: 30TDP6023 to (Ace Rinne)  Requested for: 34PMK2889; Last Rx:02Nov2017 Ordered   5  Ecotrin Low Strength 81 MG Oral Tablet Delayed Release; TAKE 1 TABLET DAILY; Therapy: (Recorded:59Sdb4516) to Recorded   6  Ezetimibe 10 MG Oral Tablet; take 1 tablet every day; Therapy: 40Ffk3732 to (96 673194)  Requested for: 76HLU2466; Last Rx:02Nov2017 Ordered   7  Famotidine 40 MG Oral Tablet; TAKE 1 TABLET AT BEDTIME; Therapy: 22VGT6480 to (Chet Rinne)  Requested for: 47DPM1407; Last Rx:02Nov2017 Ordered   8  Flax Seed Oil CAPS; Therapy: (Curt Ramirez) to Recorded   9  Nitrostat 0 4 MG Sublingual Tablet Sublingual; PLACE 1 TABLET UNDER THE TONGUE EVERY 5 MINUTES UP TO 3 DOSES AS NEEDED FOR CHEST PAIN  Requested for: 17Aug2016; Last Rx:17Aug2016 Ordered   10  ProAir RespiClick 855 (90 Base) MCG/ACT Inhalation Aerosol Powder Breath Activated;  INHALE 2 PUFFS Every 4 hours PRN Cough/wheeze; Therapy: 15VAL8796 to (Last Rx:17Aug2016) Ordered   11  Rosuvastatin Calcium 20 MG Oral Tablet; Take 1 tablet daily; Therapy: 98JJF9983 to (Evaluate:28Oct2018)  Requested for: 95MUS6496; Last  Rx:02Nov2017 Ordered   12  Verapamil HCl  MG Oral Tablet Extended Release; Take 1 tablet daily; Therapy: 95QAX8148 to (96 208205)  Requested for: 31UQU8167; Last  Rx:02Nov2017 Ordered   13  Vitamin D3 2000 UNIT Oral Capsule; Therapy: (Recorded:25Jyl3887) to Recorded  Medication List Reviewed: The medication list was reviewed and updated today  Allergies  1   Darvocet A500 TABS    Vitals  Vital Signs    Recorded: 61VXR1807 08:31AM   Temperature 97 2 F   Heart Rate 78   Respiration 16   Systolic 190   Diastolic 88   Height 5 ft 2 in   Weight 152 lb    BMI Calculated 27 8   BSA Calculated 1 7   O2 Saturation 92, RA       Physical Exam   Constitutional  General appearance: No acute distress, well appearing and well nourished  Ears, Nose, Mouth, and Throat  Nasal mucosa, septum, and turbinates: Normal without edema or erythema  Lips, teeth, and gums: Normal, good dentition  Oropharynx: Normal with no erythema, edema, exudate or lesions  Neck  Neck: Supple, symmetric, trachea midline, no masses  Jugular veins: Normal    Pulmonary  Chest: Normal    Respiratory effort: No increased work of breathing or signs of respiratory distress  Auscultation of lungs: Abnormal  -- Decreased breath sounds bilateral without wheeze, rales or rhonchi  Cardiovascular  Auscultation of heart: Normal rate and rhythm, normal S1 and S2, no murmurs  Examination of extremities for edema and/or varicosities: Normal    Abdomen  Abdomen: Soft, non-tender  Lymphatic  Palpation of lymph nodes in neck: No lymphadenopathy  Musculoskeletal  Gait and station: Normal    Digits and nails: Normal without clubbing or cyanosis  Neurologic  Mental Status: Normal  Not confused, no evidence of dementia, good comprehension, good concentration  Skin  Skin and subcutaneous tissue: Limited exam shows no rash  Psychiatric  Orientation to person, place and time: Normal    Mood and affect: Normal        Future Appointments    Date/Time Provider Specialty Site   01/18/2018 09:30 AM JOSE Beckman   Family Medicine [address] [place] [address]   04/19/2018 10:40 AM Deb Friedman DO Pulmonary Medicine [address]       Signatures   Electronically signed by : Rosey Banda DO; Dec  6 2017  9:42AM EST                       (Author) no

## 2022-05-29 LAB
CULTURE RESULTS: SIGNIFICANT CHANGE UP
HCV AB S/CO SERPL IA: 0.07 S/CO — SIGNIFICANT CHANGE UP (ref 0–0.99)
HCV AB SERPL-IMP: SIGNIFICANT CHANGE UP
SPECIMEN SOURCE: SIGNIFICANT CHANGE UP

## 2022-05-29 PROCEDURE — 99221 1ST HOSP IP/OBS SF/LOW 40: CPT

## 2022-05-29 PROCEDURE — 99233 SBSQ HOSP IP/OBS HIGH 50: CPT

## 2022-05-29 PROCEDURE — 99232 SBSQ HOSP IP/OBS MODERATE 35: CPT

## 2022-05-29 RX ORDER — SODIUM CHLORIDE 9 MG/ML
1500 INJECTION INTRAMUSCULAR; INTRAVENOUS; SUBCUTANEOUS ONCE
Refills: 0 | Status: COMPLETED | OUTPATIENT
Start: 2022-05-29 | End: 2022-05-29

## 2022-05-29 RX ADMIN — ENOXAPARIN SODIUM 40 MILLIGRAM(S): 100 INJECTION SUBCUTANEOUS at 09:53

## 2022-05-29 RX ADMIN — SODIUM CHLORIDE 1500 MILLILITER(S): 9 INJECTION INTRAMUSCULAR; INTRAVENOUS; SUBCUTANEOUS at 11:51

## 2022-05-29 RX ADMIN — Medication 650 MILLIGRAM(S): at 19:28

## 2022-05-29 RX ADMIN — Medication 650 MILLIGRAM(S): at 21:07

## 2022-05-29 RX ADMIN — Medication 1 APPLICATION(S): at 22:18

## 2022-05-29 RX ADMIN — CEFTRIAXONE 100 MILLIGRAM(S): 500 INJECTION, POWDER, FOR SOLUTION INTRAMUSCULAR; INTRAVENOUS at 09:53

## 2022-05-29 RX ADMIN — Medication 650 MILLIGRAM(S): at 13:53

## 2022-05-29 RX ADMIN — Medication 650 MILLIGRAM(S): at 14:50

## 2022-05-29 NOTE — CHART NOTE - NSCHARTNOTEFT_GEN_A_CORE
Patient seen and examined   61 F admitted with syncope   Being treated for UTI   Had an episode of syncope a year ago, was hospitalized at [facility], negative cardiac w/u, no intervention was done   EF on outpatient TTE was 60%, negative cardiac stress test  Feels ok now   Rhythm reviewed, had sinus pause ~ 3 secs preceded by nausea  Clinically stable now, denies lightheadedness, dizziness, chest pain, palpitations now   Appears dehydrated, receiving IVF   HR and BP stable now   S1S2 regular   Lungs CTA, good b/l AE   Abd soft, NT, +BS  No LE edema  Labs reviewed   Syncope vasovagal with sinus pause  D/w EP - no intervention at this time and does not need CCU monitoring, asked them to call me back if change of plan   Continue IVF, abx and other mx per medicine   Avoid paramjit blockers   Family updated at bedside

## 2022-05-29 NOTE — PROGRESS NOTE ADULT - ASSESSMENT
62 yo woman with hx of migraine headaches, breast cancer, BRCA gene+, presented to hospital after an episode of syncope. ROS notable for dysuria, labial pain and rash. UA with bacteriuria. Started on empiric antibiotics, given IV fluid 2L, and admitted to Medicine.     Syncope  Appears to be neurocardiogenic. Patient with recurrent episode while laying in bed today, had a near-syncope, concurrent hypotension and bradycardia that normalized after a couple of minutes. Gave aggressive IV fluid hydration and urgently consulted Cardiac EP. Appreciate input.   - Continue to monitor on tele  - Maintain K >4, Mg > 2  - Avoid AV paramjit blocking agents  - Obtain orthostatic vitals   - IV hydration  - Discussed increasing sodium intake and lifestyle modifications with pt/ changing position slowly/ avoiding dehydration triggers  - Echo ordered and pending  - Cardiology consultation with Dr. Todd's group    Possible UTI  Patient with dysuria on presentation. No fever. No rigors. No flank pain. UA with bacteriuria.   - On empiric ceftriaxone, day 2  - F/u in process urine culture and blood culture    Vaginal pruritis and pain, suspected lichen sclerosis   Appreciate input from Gyn. Physical exam findings suspicious for lichen sclerosis. Ordered for clobetasol propionate nightly x 6-12 weeks. Patient counselled to establish gyn care for continued surveillance and further outpatient work-up.   - Continue clobetasol  - Outpatient GYN follow up

## 2022-05-29 NOTE — CONSULT NOTE ADULT - SUBJECTIVE AND OBJECTIVE BOX
ELECTROPHYSIOLOGY CONSULTATION NOTE                                                                                             Consult requested by:  Dr Del Cotton  Reason for Consultation: Syncope/ Symptomatic Bradycardia  History obtained by: Patient and medical record   obtained: No    Chief complaint:    Patient is a 61y old  Female who presents with a chief complaint of Syncope, possible UTI (28 May 2022 16:28)        HPI:  Chief Complaint: Dysuria, episode of syncope    HPI: 61 year old woman with hx of migraine headaches, breast cancer, BRCA mutation, here for further evaluation after an episode of syncope, also requesting evaluation of severe vaginal pain and pruritis, ongoing for over a week. Rare whitish discharge. No fever. No new sexual partners. She does have some mild dysuria. No hematuria. No flank pain. No other complaints. No fever or rigors. No chest complaints.    (28 May 2022 09:03)  22-EP asked to emergently evaluate pt. after pt experienced a near-syncopal event a few minutes ago while in bed-felt "chills" temp-99.9, + lightheaded, flushing + nausea and HR plummeted to the 20s ( sinus pause) SBP-60.  Rapid fluid rescuscitation in progress, pt spontaneously returned to NSR in the 70s, SBP normalized.  Pt had been evaluated by Dr Pierre Todd last 2021, and had a normal stress test and Echo EF-60% at that time.  Tele monitor reveals -NSR 70-80s since admission with no tachy or gerry events until the aforementioned episode.      REVIEW OF SYMPTOMS:     CONSTITUTIONAL: No fever, weight loss, or fatigue  ENMT:  No difficulty hearing, tinnitus, vertigo; No sinus or throat pain  NECK: No pain or stiffness  CARDIOVASCULAR: No chest pain, dyspnea, syncope, palpitations, dizziness, Orthopnea, Paroxsymal nocturnal dyspnea  RESPIRATORY: No Dyspnea on exertion, Shortness of breath, cough, wheezing  : No dysuria, no hematuria   GI: No dark color stool, no melena, no diarrhea, no constipation, no abdominal pain   NEURO: No headache, no dizziness, no slurred speech   MUSCULOSKELETAL: No joint pain or swelling; No muscle, back, or extremity pain  PSYCH: No agitation, no anxiety.    ALL OTHER REVIEW OF SYSTEMS ARE NEGATIVE.      PREVIOUS DIAGNOSTIC TESTING  ECHO FINDINGS: 2021-EF 60%       STRESS FINDINGS: Normal 2021-Dr Todd's office as per pt.            ALLERGIES: Allergies    Dilaudid (Other)    Intolerances          PAST MEDICAL HISTORY  Breast cancer    Migraine headache    BRCA Gene Positive    Lipoma of abdominal wall        PAST SURGICAL HISTORY  S/P mastectomy, bilateral    S/P appendectomy    S/P  Section        FAMILY HISTORY: NC      SOCIAL HISTORY:  Denies smoking/alcohol/drugs + caffeine 1 cup coffee daily.  works as a medical office ilia        CURRENT MEDICATIONS:     cefTRIAXone   IVPB  clobetasol 0.05% Cream  enoxaparin Injectable  influenza   Vaccine          Vital Signs Last 24 Hrs  T(C): 37.4 (29 May 2022 11:45), Max: 38.7 (28 May 2022 17:55)  T(F): 99.4 (29 May 2022 11:45), Max: 101.7 (28 May 2022 17:55)  HR: 48 (29 May 2022 11:45) (48 - 93)  BP: 71/36 (29 May 2022 11:45) (71/36 - 132/71)  BP(mean): --  RR: 18 (29 May 2022 11:45) (18 - 18)  SpO2: 100% (29 May 2022 11:45) (95% - 100%)      PHYSICAL EXAM:  Constitutional: Comfortable . No acute distress.   HEENT: Atraumatic and normocephalic , neck is supple . no JVD. No carotid bruit. PEERL   CNS: A&Ox3. No focal deficits. EOMI. Cranial nerves II-IX are intact.   Lymph Nodes: Cervical : Not palpable.  Respiratory: CTAB  Cardiovascular: S1S2 RRR. No murmur/rubs or gallop.  Gastrointestinal: Soft non-tender and non distended . +Bowel sounds. negative Johns's sign.  Extremities: No edema.   Psychiatric: Calm . no agitation.  Skin: No skin rash/ulcers visualized to face, hands or feet.    Intake and output:     LABS:                        11.7   7.46  )-----------(       ( 27 May 2022 19:38 )             36.0     05-    137  |  102  |  11  ----------------------------<  105<H>  3.9   |  27  |  0.74    Ca    9.1      27 May 2022 19:38    TPro  7.3  /  Alb  3.8  /  TBili  0.5  /  DBili  x   /  AST  12<L>  /  ALT  12  /  AlkPhos  53  05-27      ;p-BNP=    Urinalysis Basic - ( 27 May 2022 19:38 )    Color: Yellow / Appearance: Clear / S.005 / pH: x  Gluc: x / Ketone: Trace  / Bili: Negative / Urobili: Negative   Blood: x / Protein: Negative / Nitrite: Negative   Leuk Esterase: Moderate / RBC: 0-2 /HPF / WBC 3-5   Sq Epi: x / Non Sq Epi: Occasional / Bacteria: Occasional        INTERPRETATION OF TELEMETRY: Reviewed by me. NSR no tachy or gerry events except the aforementioned vasovagal episode today  ECG: Reviewed by me. From 22-NSR HR -90 Normal axis and intervals

## 2022-05-29 NOTE — CONSULT NOTE ADULT - ASSESSMENT
A 61 year old woman with hx of migraine headaches, breast cancer, BRCA mutation, here for further evaluation after an episode of syncope, also requesting evaluation of severe vaginal pain and pruritis being treated with IV antibiotics, now s/p what appears to be a true neurocardiogenic/ vasovagal near-syncopal event.  1) Continue tele monitoring  2) Check and document orthostatic BPs  3) Increase hydration-po and IV  4) Discussed increasing sodium intake and lifestyle modifications with pt/ changing position slowly/ avoiding dehydration triggers  5) Echo ordered and pending  6) Cardiology Consultation with Dr Todd  7) All of above D/W EP attending Dr Gerardo Jackson

## 2022-05-29 NOTE — PROGRESS NOTE ADULT - SUBJECTIVE AND OBJECTIVE BOX
Chief Complaint: Syncope, also painful labial rash    Interval History: Patient seen and examined. This AM while evaluating patient, she was initially fairly well-appearing, had no complaints other than continued discomfort related to her suspected lichen sclerosis for which she was started on clobetasol topical last night as per GYN. Then, all of a sudden, she developed a sense of chills (no rigors), then profound lightheadedness and nausea, complaining that the felt as though she was going to pass out. This event was all while she was laying in her hospital bed. Obtained stat vitals and she was noted to have hypotension and bradycardia. Checked the telemetry and she was having bradycardia down to 20s with sinus pause of a few seconds. She then returned to a regular rate and rhythm with normalization of blood pressure over the course of several minutes. IV normal saline 1.5L bolus was ordered. Cardiac EP was urgently consulted and ICU team was made aware as well. Both EP and ICU evaluated patient. As per EP, patient can remain on Medicine Tele 3E. Suspect neurocardiogenic syncope. Advised medical management. Consult with General Cardiology. Agree with IV fluids. Can add salt to diet. Monitor vitals. No need for pacing. No need to start midodrine at this point.     ROS: Multi system review is comprehensively negative x 10 systems except as above.     Vitals:  T(F): 99.4 (29 May 2022 11:45), Max: 101.7 (28 May 2022 17:55)  HR: 70 (29 May 2022 13:35) (48 - 93)  BP: 112/96 (29 May 2022 13:35) (71/36 - 132/71)  RR: 18 (29 May 2022 11:45) (18 - 18)  SpO2: 100% (29 May 2022 11:45) (95% - 100%)    Exam:  Gen: No acute distress  HEENT: NCAT PERRL EOMI clear oropharynx  Neck: Supple, no JVD  Chest: Normal resp effort at rest, lungs CTA B/L  CVS: S1 S2 normal RRR  Abd: Soft NT ND +BS  GYN exam: (As per GYN Consult from this afternoon) External genitalia w/ coalescing hypopigmented papules on the external labial bilaterally with similar hypopigmented papules on the bilateral inner thighs. Exquisite tenderness on digital exam, unable to advance pass the introitus 2/2 pain / atrophy. No unusual discharge or vaginal bleeding noted   Ext: No edema or calf tenderness  Skin: Warm, dry, intact  Neuro: AOx3, no gross deficits  Mood: Calm, pleasant    Labs:               11.7   7.46 )-----------( 202              36.0       137  |  102  |  11  -----------------------<  105  3.9   |  27  |  0.74    Ca    9.1      27 May 2022 19:38    TPro  7.3  /  Alb  3.8  /  TBili  0.5  /  DBili  x   /  AST  12  /  ALT  12  /  AlkPhos  53      Lactate, Blood 1.2 mmol/L    Troponin (-)    Urinalysis Basic - ( 27 May 2022 19:38 )  Color: Yellow / Appearance: Clear / S.005 / pH: x  Gluc: x / Ketone: Trace  / Bili: Negative / Urobili: Negative   Blood: Trace / Protein: Negative / Nitrite: Negative   Leuk Esterase: Moderate / RBC: 0-2 /HPF / WBC 3-5   Sq Epi: x / Non Sq Epi: Occasional / Bacteria: Occasional    Micro:  Urine culture in process  Blood culture in process  COVID19 PCR negative    Imaging:  CXR performed, report pending. Grossly clear lungs. Normal cardiac silhouette.     Cardiac Testing:  TTE pending    Tele : NSR, normal rate, then brief event of bradycardia with sinus pauses associated with near syncope, then reverted to NSR normal rate.     EKG : No acute ischemic changes. No dysrhythmia.    Meds:  MEDICATIONS  (STANDING):  cefTRIAXone   IVPB 1000 milliGRAM(s) IV Intermittent every 24 hours  clobetasol 0.05% Cream 1 Application(s) Topical <User Schedule>  enoxaparin Injectable 40 milliGRAM(s) SubCutaneous every 24 hours  influenza   Vaccine 0.5 milliLiter(s) IntraMuscular once    MEDICATIONS  (PRN):  acetaminophen     Tablet .. 650 milliGRAM(s) Oral every 6 hours PRN Temp greater or equal to 38C (100.4F), Mild Pain (1 - 3)  phenazopyridine 200 milliGRAM(s) Oral three times a day PRN dysuria

## 2022-05-30 LAB
ANION GAP SERPL CALC-SCNC: 4 MMOL/L — LOW (ref 5–17)
BUN SERPL-MCNC: 5 MG/DL — LOW (ref 7–23)
CALCIUM SERPL-MCNC: 9 MG/DL — SIGNIFICANT CHANGE UP (ref 8.5–10.1)
CHLORIDE SERPL-SCNC: 105 MMOL/L — SIGNIFICANT CHANGE UP (ref 96–108)
CO2 SERPL-SCNC: 31 MMOL/L — SIGNIFICANT CHANGE UP (ref 22–31)
CREAT SERPL-MCNC: 0.39 MG/DL — LOW (ref 0.5–1.3)
EGFR: 113 ML/MIN/1.73M2 — SIGNIFICANT CHANGE UP
GLUCOSE SERPL-MCNC: 100 MG/DL — HIGH (ref 70–99)
HCT VFR BLD CALC: 33.9 % — LOW (ref 34.5–45)
HGB BLD-MCNC: 11.4 G/DL — LOW (ref 11.5–15.5)
MAGNESIUM SERPL-MCNC: 2.3 MG/DL — SIGNIFICANT CHANGE UP (ref 1.6–2.6)
MCHC RBC-ENTMCNC: 29.1 PG — SIGNIFICANT CHANGE UP (ref 27–34)
MCHC RBC-ENTMCNC: 33.6 GM/DL — SIGNIFICANT CHANGE UP (ref 32–36)
MCV RBC AUTO: 86.5 FL — SIGNIFICANT CHANGE UP (ref 80–100)
PHOSPHATE SERPL-MCNC: 3.2 MG/DL — SIGNIFICANT CHANGE UP (ref 2.5–4.5)
PLATELET # BLD AUTO: 189 K/UL — SIGNIFICANT CHANGE UP (ref 150–400)
POTASSIUM SERPL-MCNC: 3.1 MMOL/L — LOW (ref 3.5–5.3)
POTASSIUM SERPL-SCNC: 3.1 MMOL/L — LOW (ref 3.5–5.3)
RBC # BLD: 3.92 M/UL — SIGNIFICANT CHANGE UP (ref 3.8–5.2)
RBC # FLD: 12.5 % — SIGNIFICANT CHANGE UP (ref 10.3–14.5)
SODIUM SERPL-SCNC: 140 MMOL/L — SIGNIFICANT CHANGE UP (ref 135–145)
WBC # BLD: 2.85 K/UL — LOW (ref 3.8–10.5)
WBC # FLD AUTO: 2.85 K/UL — LOW (ref 3.8–10.5)

## 2022-05-30 PROCEDURE — 99231 SBSQ HOSP IP/OBS SF/LOW 25: CPT

## 2022-05-30 PROCEDURE — 99232 SBSQ HOSP IP/OBS MODERATE 35: CPT

## 2022-05-30 RX ORDER — POTASSIUM CHLORIDE 20 MEQ
40 PACKET (EA) ORAL EVERY 4 HOURS
Refills: 0 | Status: COMPLETED | OUTPATIENT
Start: 2022-05-30 | End: 2022-05-30

## 2022-05-30 RX ORDER — OXYCODONE HYDROCHLORIDE 5 MG/1
2.5 TABLET ORAL EVERY 4 HOURS
Refills: 0 | Status: DISCONTINUED | OUTPATIENT
Start: 2022-05-30 | End: 2022-05-31

## 2022-05-30 RX ORDER — POTASSIUM CHLORIDE 20 MEQ
40 PACKET (EA) ORAL ONCE
Refills: 0 | Status: COMPLETED | OUTPATIENT
Start: 2022-05-30 | End: 2022-05-30

## 2022-05-30 RX ORDER — OXYCODONE HYDROCHLORIDE 5 MG/1
2.5 TABLET ORAL ONCE
Refills: 0 | Status: DISCONTINUED | OUTPATIENT
Start: 2022-05-30 | End: 2022-05-30

## 2022-05-30 RX ORDER — LIDOCAINE 4 G/100G
1 CREAM TOPICAL EVERY 12 HOURS
Refills: 0 | Status: DISCONTINUED | OUTPATIENT
Start: 2022-05-30 | End: 2022-06-01

## 2022-05-30 RX ORDER — ACETAMINOPHEN 500 MG
975 TABLET ORAL EVERY 8 HOURS
Refills: 0 | Status: DISCONTINUED | OUTPATIENT
Start: 2022-05-30 | End: 2022-06-01

## 2022-05-30 RX ORDER — POTASSIUM CHLORIDE 20 MEQ
40 PACKET (EA) ORAL EVERY 4 HOURS
Refills: 0 | Status: DISCONTINUED | OUTPATIENT
Start: 2022-05-30 | End: 2022-05-30

## 2022-05-30 RX ORDER — OXYCODONE HYDROCHLORIDE 5 MG/1
5 TABLET ORAL EVERY 4 HOURS
Refills: 0 | Status: DISCONTINUED | OUTPATIENT
Start: 2022-05-30 | End: 2022-05-31

## 2022-05-30 RX ORDER — LIDOCAINE 4 G/100G
1 CREAM TOPICAL DAILY
Refills: 0 | Status: DISCONTINUED | OUTPATIENT
Start: 2022-05-30 | End: 2022-06-01

## 2022-05-30 RX ADMIN — Medication 650 MILLIGRAM(S): at 11:30

## 2022-05-30 RX ADMIN — Medication 40 MILLIEQUIVALENT(S): at 09:00

## 2022-05-30 RX ADMIN — Medication 1 APPLICATION(S): at 21:18

## 2022-05-30 RX ADMIN — Medication 975 MILLIGRAM(S): at 21:18

## 2022-05-30 RX ADMIN — Medication 200 MILLIGRAM(S): at 10:31

## 2022-05-30 RX ADMIN — OXYCODONE HYDROCHLORIDE 2.5 MILLIGRAM(S): 5 TABLET ORAL at 13:40

## 2022-05-30 RX ADMIN — Medication 40 MILLIEQUIVALENT(S): at 13:40

## 2022-05-30 RX ADMIN — Medication 975 MILLIGRAM(S): at 21:19

## 2022-05-30 RX ADMIN — Medication 650 MILLIGRAM(S): at 10:30

## 2022-05-30 RX ADMIN — OXYCODONE HYDROCHLORIDE 5 MILLIGRAM(S): 5 TABLET ORAL at 20:22

## 2022-05-30 RX ADMIN — OXYCODONE HYDROCHLORIDE 5 MILLIGRAM(S): 5 TABLET ORAL at 19:38

## 2022-05-30 RX ADMIN — ENOXAPARIN SODIUM 40 MILLIGRAM(S): 100 INJECTION SUBCUTANEOUS at 09:00

## 2022-05-30 RX ADMIN — CEFTRIAXONE 100 MILLIGRAM(S): 500 INJECTION, POWDER, FOR SOLUTION INTRAMUSCULAR; INTRAVENOUS at 08:59

## 2022-05-30 RX ADMIN — OXYCODONE HYDROCHLORIDE 2.5 MILLIGRAM(S): 5 TABLET ORAL at 15:01

## 2022-05-30 NOTE — PROGRESS NOTE ADULT - SUBJECTIVE AND OBJECTIVE BOX
Chief Complaint: Syncope, painful rash of the inner thighs and vulva    Interval History: Yesterday, patient with an episode of likely neuro-cardiogenic near-syncope while laying in bed, possibly attributable to her severe vulvar pain. No further near-syncopal or syncopal events. No further telemetry derangements. Vitals stable. Orthostatics negative. She continues to report vulvar pain. Apprehensive about placing topical clobetasol without adequate pain control. Patient seen by GYN, started on topical lidocaine jelly. Also, added acetaminophen around the clock and oxycodone prn. Labs today notable for hypokalemia, ordered for potassium supplementation.     ROS: Multi system review is comprehensively negative x 10 systems except as above.     Vitals:  T(F): 98.7 (30 May 2022 14:20), Max: 98.7 (30 May 2022 14:20)  HR: 76 (30 May 2022 14:20) (69 - 76)  BP: 119/71 (30 May 2022 14:20) (119/71 - 138/76)  RR: 18 (30 May 2022 14:20) (18 - 18)  SpO2: 96% (30 May 2022 14:20) (96% - 97%) on room air    Exam:  Gen: No acute distress  HEENT: NCAT PERRL EOMI clear oropharynx  Neck: Supple, no JVD  Chest: Normal resp effort at rest, lungs CTA B/L  CVS: S1 S2 normal RRR  Abd: Soft NT ND +BS  Pelvis: External genitalia w/ coalescing hypopigmented papules on the external labial bilaterally with similar hypopigmented papules on the bilateral inner thighs. Exquisite tenderness. No unusual discharge or vaginal bleeding   Ext: No edema or calf tenderness  Skin: Warm, dry, intact  Neuro: AOx3, no gross deficits  Mood: Calm, pleasant    Labs:                       11.4   2.85 )-----------( 189              33.9       140  |  105  |  5  ---------------------< 100  3.1   |   31   |  0.39    Ca 9.0  Phos 3.2   Mg 2.3    TPro  7.3  /  Alb  3.8  /  TBili  0.5  /  DBili  x   /  AST  12  /  ALT  12  /  AlkPhos  53      Lactate, Blood 1.2 mmol/L    Troponin (-)    Urinalysis Basic - ( 27 May 2022 19:38 )  Color: Yellow / Appearance: Clear / S.005 / pH: x  Gluc: x / Ketone: Trace  / Bili: Negative / Urobili: Negative   Blood: Trace / Protein: Negative / Nitrite: Negative   Leuk Esterase: Moderate / RBC: 0-2 /HPF / WBC 3-5   Sq Epi: x / Non Sq Epi: Occasional / Bacteria: Occasional    Micro:  Urine culture negative  Blood culture negative  COVID19 PCR negative    Imaging:  CXR : Lungs are clear. No large effusions or pneumothoraces. Cardiomediastinal silhouette is within normal limits. Osseous structures are unremarkable for age.    Cardiac Testing:  TTE pending  MEDICATIONS  (STANDING):  acetaminophen     Tablet .. 975 milliGRAM(s) Oral every 8 hours  clobetasol 0.05% Cream 1 Application(s) Topical <User Schedule>  enoxaparin Injectable 40 milliGRAM(s) SubCutaneous every 24 hours  influenza   Vaccine 0.5 milliLiter(s) IntraMuscular once    MEDICATIONS  (PRN):  lidocaine 2% Gel 1 Application(s) Topical daily PRN vulvar pain prior to clobetasol  lidocaine 2% Gel 1 Application(s) Topical every 12 hours PRN pain  oxyCODONE    IR 2.5 milliGRAM(s) Oral every 4 hours PRN Moderate Pain (4 - 6)  oxyCODONE    IR 5 milliGRAM(s) Oral every 4 hours PRN Severe Pain (7 - 10)  phenazopyridine 200 milliGRAM(s) Oral three times a day PRN dysuria    Tele : NSR, normal rate, no events    Tele : NSR, normal rate, then brief event of bradycardia with sinus pauses associated with near syncope, then reverted to NSR normal rate.     EKG : No acute ischemic changes. No dysrhythmia.    Meds:

## 2022-05-30 NOTE — CONSULT NOTE ADULT - SUBJECTIVE AND OBJECTIVE BOX
Chief Complaint: Dysuria, episode of syncope    HPI: 61 year old woman with hx of migraine headaches, breast cancer, BRCA mutation, here for further evaluation after an episode of syncope, also requesting evaluation of severe vaginal pain and pruritis, ongoing for over a week. Rare whitish discharge. No fever. No new sexual partners. She does have some mild dysuria. No hematuria. No flank pain. No other complaints. No fever or rigors. No chest complaints.    (28 May 2022 09:03)    22  today, she is more concerned about the vaginal pain and discomfort although it is improved since she was admitted.   vitals yesterday showed no orthostatic hypotension.   no rhythm abnormalities on telemetry monitoring.   overall, from a cardiac perspective, she feels better than upon admission.       PAST MEDICAL & SURGICAL HISTORY:  Breast cancer      Migraine headache      BRCA Gene Positive      Lipoma of abdominal wall      S/P mastectomy, bilateral  2009 with silicone implants      S/P appendectomy  1976      S/P  Section   and         MEDICATIONS  (STANDING):  clobetasol 0.05% Cream 1 Application(s) Topical <User Schedule>  enoxaparin Injectable 40 milliGRAM(s) SubCutaneous every 24 hours  influenza   Vaccine 0.5 milliLiter(s) IntraMuscular once  potassium chloride    Tablet ER 40 milliEquivalent(s) Oral every 4 hours    MEDICATIONS  (PRN):  acetaminophen     Tablet .. 650 milliGRAM(s) Oral every 6 hours PRN Temp greater or equal to 38C (100.4F), Mild Pain (1 - 3)  phenazopyridine 200 milliGRAM(s) Oral three times a day PRN dysuria    Allergies    Dilaudid (Other)    Intolerances      FAMILY HISTORY:        REVIEW OF SYSTEMS:    CONSTITUTIONAL: No weakness, fevers or chills  EYES/ENT: No visual changes;  No vertigo or throat pain   NECK: No pain or stiffness  RESPIRATORY: No cough, wheezing, hemoptysis; No shortness of breath  CARDIOVASCULAR: No chest pain or palpitations  GASTROINTESTINAL: No abdominal or epigastric pain. No nausea, vomiting, or hematemesis; No diarrhea or constipation. No melena or hematochezia.  GENITOURINARY: No dysuria, frequency or hematuria  NEUROLOGICAL: No numbness or weakness  SKIN: No itching, burning, rashes, or lesions   All other review of systems is negative unless indicated above      PHYSICAL EXAM:  Daily     Daily   Vital Signs Last 24 Hrs  T(C): 37 (30 May 2022 07:54), Max: 37.4 (29 May 2022 11:45)  T(F): 98.6 (30 May 2022 07:54), Max: 99.4 (29 May 2022 11:45)  HR: 69 (30 May 2022 07:54) (48 - 72)  BP: 138/76 (30 May 2022 07:54) (71/36 - 138/76)  BP(mean): --  RR: 18 (30 May 2022 07:54) (18 - 18)  SpO2: 97% (30 May 2022 07:54) (97% - 100%)    Constitutional: NAD, awake and alert, well-developed  HEENT: PERR, EOMI, Normal Hearing, MMM  Neck: Soft and supple, No LAD, No JVD  Respiratory: Breath sounds are clear bilaterally, No wheezing, rales or rhonchi  Cardiovascular: S1 and S2, regular rate and rhythm, no Murmurs, gallops or rubs  Gastrointestinal: Bowel Sounds present, soft, nontender, nondistended, no guarding, no rebound  Extremities: No peripheral edema  Vascular: 2+ peripheral pulses  Neurological: A/O x 3, no focal deficits  Musculoskeletal: 5/5 strength b/l upper and lower extremities  Skin: No rashes    LABS: All Labs Reviewed:                        11.4   2.85  )-----------( 189      ( 30 May 2022 07:08 )             33.9     05-30    140  |  105  |  5<L>  ----------------------------<  100<H>  3.1<L>   |  31  |  0.39<L>    Ca    9.0      30 May 2022 07:08  Phos  3.2     05-30  Mg     2.3     05-30            Blood Culture: Organism --  Gram Stain Blood -- Gram Stain --  Specimen Source .Blood None  Culture-Blood --    Organism --  Gram Stain Blood -- Gram Stain --  Specimen Source .Blood None  Culture-Blood --    Organism --  Gram Stain Blood -- Gram Stain --  Specimen Source Clean Catch None  Culture-Blood --        RADIOLOGY:   < from: Xray Chest 1 View-PORTABLE IMMEDIATE (Xray Chest 1 View-PORTABLE IMMEDIATE .) (05.27.22 @ 21:06) >  ACC: [id number] EXAM:  XR CHEST PORTABLE IMMED 1V                          PROCEDURE DATE:  2022          INTERPRETATION:  Clinical Information:  Fever    Technique: AP chest image.    Comparison: No comparison    Findings/  Impression:    Lungs are clear.    No large effusions or pneumothoraces    Cardiomediastinal silhouette is within normal limits.    Osseous structures are unremarkable for age.    < end of copied text >  EKG: NSR, Normal EKG    CARDIOLOGY TESTING:

## 2022-05-30 NOTE — CONSULT NOTE ADULT - ASSESSMENT
61 year old woman with hx of migraine headaches, breast cancer, BRCA mutation, here for further evaluation after an episode of syncope.    5/30/22  suspect vasovagal syncope in the setting of a UTI and vaginal pain.   potassium supplementation.   stable from a cardiac perspective.

## 2022-05-30 NOTE — PROGRESS NOTE ADULT - SUBJECTIVE AND OBJECTIVE BOX
ELECTROPHYSIOLOGY   PROGRESS NOTE    Reason for follow up: s/p Syncope Suspect vasovagal event in setting of UTI/ vaginal pain  Overnight: No new events. NSR , 1 junstional best detected @ 11:30 PM Avg HR 70s  Update: Vaginal Pain persists-no orthostasis revealed. Denies any c/o CP, SOB or palpitations.    Subjective: "  __________The vaginal pain is what's bothering me most____________"      	  Vitals:  T(C): 37 (05-30-22 @ 07:54), Max: 37.4 (05-29-22 @ 11:45)  HR: 69 (05-30-22 @ 07:54) (48 - 72)  BP: 138/76 (05-30-22 @ 07:54) (71/36 - 138/76)  RR: 18 (05-30-22 @ 07:54) (18 - 18)  SpO2: 97% (05-30-22 @ 07:54) (97% - 100%)  Wt(kg): --  I&O's Summary    Weight (kg): 56.699 (05-27 @ 16:52)      PHYSICAL EXAM:  Appearance: Comfortable. No acute distress  HEENT:  Head and neck: Atraumatic. Normocephalic.  Normal oral mucosa, PERRL, Neck is supple. No JVD, No carotid bruit.   Neurologic: A & O x 3, no focal deficits. EOMI.  Lymphatic: No cervical lymphadenopathy  Cardiovascular: Normal S1 S2, No murmur, rubs/gallops. No JVD, No edema  Respiratory: Lungs clear to auscultation  Gastrointestinal:  Soft, Non-tender, + BS  Lower Extremities: No edema  Psychiatry: Patient is calm. No agitation. Mood & affect appropriate  Skin: No rashes/ ecchymoses/cyanosis/ulcers visualized on the face, hands or feet.      CURRENT MEDICATIONS:    acetaminophen     Tablet ..  oxyCODONE    IR  clobetasol 0.05% Cream  enoxaparin Injectable  influenza   Vaccine  potassium chloride    Tablet ER        LABS:	 	                            11.4   2.85  )-----------( 189      ( 30 May 2022 07:08 )             33.9     05-30    140  |  105  |  5<L>  ----------------------------<  100<H>  3.1<L>   |  31  |  0.39<L>    Ca    9.0      30 May 2022 07:08  Phos  3.2     05-30  Mg     2.3     05-30      proBNP:   Lipid Profile:   HgA1c:   TSH:       TELEMETRY: Reviewed  As above  ECG:  Reviewed by me. 	NSR HR 70s

## 2022-05-30 NOTE — PROGRESS NOTE ADULT - ASSESSMENT
60 yo woman with hx of migraine headaches, breast cancer, BRCA gene+, presented to hospital after an episode of syncope. ROS notable for dysuria, labial pain and rash. UA with bacteriuria. Started on empiric antibiotics, given IV fluid 2L, and admitted to Medicine.     Syncope  Appears to be neurocardiogenic, vaso-vagal in setting of severe vulvar pain related to her rash (see below). Patient with recurrent episode while laying in hospital bed on 5/29, had a near-syncope preceded by nausea, lightheadedness, felt faint, with concurrent profound hypotension and bradycardia that normalized after a minute or so. She was given aggressive IV fluid hydration and Cardiac EP was consulted. Suspect neurocardiogenic syncope. Advised added salt to diet, maintaining adequate hydration, treating pain complaints and the underlying issue for that pain. Orthostatics negative. Hgb stable. No sign of infection as Ucx is actually negative.   - Discussed increasing sodium intake and lifestyle modifications with pt/ changing position slowly/ avoiding dehydration triggers  - Hydration  - Maintain K >4, Mg > 2  - Avoid AV paramjit blocking agents  - Echo ordered and pending  - Continue to monitor on tele    Vaginal pruritis and pain, suspected lichen sclerosis   Appreciate input from Gyn. Physical exam findings suspicious for lichen sclerosis. Ordered for clobetasol propionate nightly x 6-12 weeks. Patient counselled to establish gyn care for continued surveillance and further outpatient work-up. Risk for development of squamous cell cancer.  - Continue clobetasol  - Pain control (on topical lidocaine to vulva, pyridium, acetaminophen q8h, oxycodone prn breakthrough pain)  - Outpatient GYN follow up    Abnormal UA  Patient with discomfort with urination upon presentation. UA only slightly abnormal with 5WBC, occ bacteria, N-, LE-. Started on empiric ceftriaxone and completed 3 days though on day 3, her urine culture returned negative. Patient with symptomatic relief from pyridium it seems.   - Continue to monitor    Hypokalemia  K 3.1 today.   - Ordered for potassium supplementation, goal K ~4

## 2022-05-30 NOTE — CHART NOTE - NSCHARTNOTEFT_GEN_A_CORE
Reevaluated patient at bedside given vulvar pain  5/28 gyn consulted for same issue, diagnosed with vulvar dermatosis   upon our evaluation, appears to be lichen simplex chronicus vs lichen planus   patient reports similar issues in the past and reports workup by dermatologist yielding results for lichen simplex chronicus  in the past has been responsive to clobetasol therapy  denies hx of other dermatologic conditions  patient reports that due to the extreme pain, patient only used clobetasol once; skipped last night  however, she is able to touch     physical exam:  notable for one satellite white plaques with erosive erythema on each inner thigh  mild tenderness to light palpation of vulva  bilateral labia majora thickened with excoriations, hyperpigmentation  internal exam deferred; reports no discharge or internal pruritis / pain upon previous internal exam    A/P    #vulvar dermatosis  -lichen planus vs hx of known lichen simplex chronicus  -continue clobetasol nightly  - recommend premedication with tylenol prior to application of clobetasol;  +/- lidocaine gel if premedication does not work; advised that lidocaine gel may not resolve symptom / may decrease surface area with which clobetasol ultimately penetrates  -may add diflucan if possible element of candidiasis  -at this time not meeting criteria for requiring other modalities of administration of steroids (i.e. systemic) / second-line treatments for lichen  -will require outpatient f/u for resolution vs other management or possible need for biopsy    discussed with attending Dr Ulloa

## 2022-05-30 NOTE — PROGRESS NOTE ADULT - ASSESSMENT
A 61 year old woman with hx of migraine headaches, breast cancer, BRCA mutation, here for further evaluation after an episode of syncope, also requesting evaluation of severe vaginal pain and pruritis being treated with IV antibiotics, now s/p what appears to be a true neurocardiogenic/ vasovagal near-syncopal event.  1) Continue tele monitoring  2) Continue GYN follow up / management of pain source  3) Continue to Increase hydration-po   4) Discussed increasing sodium intake ( diet changed to regular) and lifestyle modifications with pt/ changing position slowly/ avoiding dehydration triggers  5) Echo ordered and pending  6) Cardiology Consultation with Dr Chloe Damon Appreciated  7) All of above D/W EP attending Dr Gerardo Jackson

## 2022-05-31 LAB
ANION GAP SERPL CALC-SCNC: 3 MMOL/L — LOW (ref 5–17)
BUN SERPL-MCNC: 8 MG/DL — SIGNIFICANT CHANGE UP (ref 7–23)
CALCIUM SERPL-MCNC: 8.9 MG/DL — SIGNIFICANT CHANGE UP (ref 8.5–10.1)
CHLORIDE SERPL-SCNC: 107 MMOL/L — SIGNIFICANT CHANGE UP (ref 96–108)
CO2 SERPL-SCNC: 31 MMOL/L — SIGNIFICANT CHANGE UP (ref 22–31)
CREAT SERPL-MCNC: 0.48 MG/DL — LOW (ref 0.5–1.3)
EGFR: 108 ML/MIN/1.73M2 — SIGNIFICANT CHANGE UP
GLUCOSE SERPL-MCNC: 94 MG/DL — SIGNIFICANT CHANGE UP (ref 70–99)
HCT VFR BLD CALC: 33.8 % — LOW (ref 34.5–45)
HGB BLD-MCNC: 11.2 G/DL — LOW (ref 11.5–15.5)
MAGNESIUM SERPL-MCNC: 2.3 MG/DL — SIGNIFICANT CHANGE UP (ref 1.6–2.6)
MCHC RBC-ENTMCNC: 29.5 PG — SIGNIFICANT CHANGE UP (ref 27–34)
MCHC RBC-ENTMCNC: 33.1 GM/DL — SIGNIFICANT CHANGE UP (ref 32–36)
MCV RBC AUTO: 88.9 FL — SIGNIFICANT CHANGE UP (ref 80–100)
PHOSPHATE SERPL-MCNC: 3.4 MG/DL — SIGNIFICANT CHANGE UP (ref 2.5–4.5)
PLATELET # BLD AUTO: 208 K/UL — SIGNIFICANT CHANGE UP (ref 150–400)
POTASSIUM SERPL-MCNC: 4.4 MMOL/L — SIGNIFICANT CHANGE UP (ref 3.5–5.3)
POTASSIUM SERPL-SCNC: 4.4 MMOL/L — SIGNIFICANT CHANGE UP (ref 3.5–5.3)
RBC # BLD: 3.8 M/UL — SIGNIFICANT CHANGE UP (ref 3.8–5.2)
RBC # FLD: 12.4 % — SIGNIFICANT CHANGE UP (ref 10.3–14.5)
SODIUM SERPL-SCNC: 141 MMOL/L — SIGNIFICANT CHANGE UP (ref 135–145)
WBC # BLD: 3.13 K/UL — LOW (ref 3.8–10.5)
WBC # FLD AUTO: 3.13 K/UL — LOW (ref 3.8–10.5)

## 2022-05-31 PROCEDURE — 99232 SBSQ HOSP IP/OBS MODERATE 35: CPT

## 2022-05-31 PROCEDURE — 93306 TTE W/DOPPLER COMPLETE: CPT | Mod: 26

## 2022-05-31 RX ORDER — PHENYLEPHRINE-SHARK LIVER OIL-MINERAL OIL-PETROLATUM RECTAL OINTMENT
1 OINTMENT (GRAM) RECTAL DAILY
Refills: 0 | Status: DISCONTINUED | OUTPATIENT
Start: 2022-05-31 | End: 2022-06-01

## 2022-05-31 RX ORDER — OXYCODONE HYDROCHLORIDE 5 MG/1
10 TABLET ORAL EVERY 4 HOURS
Refills: 0 | Status: DISCONTINUED | OUTPATIENT
Start: 2022-05-31 | End: 2022-06-01

## 2022-05-31 RX ORDER — CHLORHEXIDINE GLUCONATE 213 G/1000ML
1 SOLUTION TOPICAL
Refills: 0 | Status: DISCONTINUED | OUTPATIENT
Start: 2022-05-31 | End: 2022-06-01

## 2022-05-31 RX ORDER — OXYCODONE HYDROCHLORIDE 5 MG/1
5 TABLET ORAL EVERY 4 HOURS
Refills: 0 | Status: DISCONTINUED | OUTPATIENT
Start: 2022-05-31 | End: 2022-06-01

## 2022-05-31 RX ADMIN — OXYCODONE HYDROCHLORIDE 5 MILLIGRAM(S): 5 TABLET ORAL at 10:10

## 2022-05-31 RX ADMIN — OXYCODONE HYDROCHLORIDE 10 MILLIGRAM(S): 5 TABLET ORAL at 20:31

## 2022-05-31 RX ADMIN — OXYCODONE HYDROCHLORIDE 10 MILLIGRAM(S): 5 TABLET ORAL at 16:57

## 2022-05-31 RX ADMIN — PHENYLEPHRINE-SHARK LIVER OIL-MINERAL OIL-PETROLATUM RECTAL OINTMENT 1 APPLICATION(S): at 13:19

## 2022-05-31 RX ADMIN — OXYCODONE HYDROCHLORIDE 10 MILLIGRAM(S): 5 TABLET ORAL at 17:44

## 2022-05-31 RX ADMIN — Medication 975 MILLIGRAM(S): at 13:16

## 2022-05-31 RX ADMIN — OXYCODONE HYDROCHLORIDE 5 MILLIGRAM(S): 5 TABLET ORAL at 11:10

## 2022-05-31 RX ADMIN — Medication 1 APPLICATION(S): at 21:05

## 2022-05-31 RX ADMIN — OXYCODONE HYDROCHLORIDE 10 MILLIGRAM(S): 5 TABLET ORAL at 20:30

## 2022-05-31 RX ADMIN — CHLORHEXIDINE GLUCONATE 1 APPLICATION(S): 213 SOLUTION TOPICAL at 10:08

## 2022-05-31 RX ADMIN — Medication 975 MILLIGRAM(S): at 21:05

## 2022-05-31 RX ADMIN — Medication 975 MILLIGRAM(S): at 21:06

## 2022-05-31 RX ADMIN — ENOXAPARIN SODIUM 40 MILLIGRAM(S): 100 INJECTION SUBCUTANEOUS at 10:07

## 2022-05-31 RX ADMIN — Medication 975 MILLIGRAM(S): at 05:34

## 2022-05-31 NOTE — CHART NOTE - NSCHARTNOTEFT_GEN_A_CORE
61 year old woman admitted with syncope. and chills  HPI noted to be febrile on admission with temp of 102.8.  also requesting evaluation of severe vaginal pain and pruritis, ongoing for over a week. Rare whitish discharge. No fever. No new sexual partners. She does have some mild dysuria. No hematuria. No flank pain.    Hx of migraine headaches, breast cancer, BRCA mutation.  Pt had been evaluated by Dr Pierre Todd last 2/2021, and had a normal stress test and Echo EF-60% at that time.    Tele monitor reveals -NSR 70-80s since admission with no tachy or gerry events .    5/29/22-EP asked to emergently evaluate pt. after pt experienced a near-syncopal event a few minutes ago while in bed-felt "chills" temp-99.9, + lightheaded, flushing + nausea and HR plummeted to the 20s ( sinus pause) SBP-60.  Rapid fluid resuscitation given and pt returned to NSR in the 70s, SBP normalized.  She has been in SR since but continue to complain of  severe vaginal pain.    Continue to hydrate and treat infection  EP signing off and will see pt PRN 61 year old woman admitted with syncope. and chills  HPI noted to be febrile on admission with temp of 102.8.  also requesting evaluation of severe vaginal pain and pruritis, ongoing for over a week. Rare whitish discharge. No fever. No new sexual partners. She does have some mild dysuria. No hematuria. No flank pain.    Hx of migraine headaches, breast cancer, BRCA mutation.  Pt had been evaluated by Dr Pierre Todd last 2/2021, and had a normal stress test and Echo EF-60% at that time.    Tele monitor reveals -NSR 70-80s since admission with no tachy or gerry events .    5/29/22-EP asked to emergently evaluate pt. after pt experienced a near-syncopal event a few minutes ago while in bed-felt "chills" temp-99.9, + lightheaded, flushing + nausea and HR plummeted to the 20s ( sinus pause) SBP-60.  Rapid fluid resuscitation given and pt returned to NSR in the 70s, SBP normalized.  She has been in SR since but continue to complain of  severe vaginal pain.    Continue to hydrate and treat infection  EP signing off and will see pt PRN  obtain echo results

## 2022-05-31 NOTE — PROGRESS NOTE ADULT - REASON FOR ADMISSION
Syncope
Syncope, painful rash of the inner thighs and vulva
Syncope, possible UTI
Syncope, possible UTI
Syncope, painful rash of the inner thighs and vulva

## 2022-05-31 NOTE — PROGRESS NOTE ADULT - SUBJECTIVE AND OBJECTIVE BOX
Chief Complaint: Syncope, painful rash of the inner thighs and vulva    Interval History: No further acute events. Patient remains stable. No events on telemetry since her likely pain-triggered vaso-vagal near-syncope she had in her hospital bed on . Vitals stable. Orthostatics negative. She continues to report vulvar pain. Apprehensive about placing topical clobetasol without adequate pain control. Patient seen by GYN, started on topical lidocaine jelly. Also, added acetaminophen around the clock and oxycodone prn with the oxycodone dosage increased today since she was continuing to report severe pain preventing her from applying the clobetasol. She is also now reporting painful hemorrhoids, confirmed on exam, ordered for hemorrhoidal ointment.     ROS: Multi system review is comprehensively negative x 10 systems except as above.     Vitals:  T(F): 99.2 (31 May 2022 08:30), Max: 99.2 (31 May 2022 08:30)  HR: 71 (31 May 2022 08:30) (65 - 71)  BP: 139/85 (31 May 2022 08:30) (127/82 - 139/85)  RR: 18 (31 May 2022 08:30) (18 - 19)  SpO2: 97% (31 May 2022 08:30) (96% - 97%)    Exam:  Gen: No acute distress  HEENT: NCAT PERRL EOMI clear oropharynx  Neck: Supple, no JVD  Chest: Normal resp effort at rest, lungs CTA B/L  CVS: S1 S2 normal RRR  Abd: Soft NT ND +BS  Pelvis: External genitalia w/ coalescing hypopigmented papules on the external labial bilaterally with similar hypopigmented papules on the bilateral inner thighs. Exquisite tenderness. No unusual discharge or vaginal bleeding. Anus with protruding slightly tender hemorrhoids. BERNARD not performed.   Ext: No edema or calf tenderness  Skin: Warm, dry, intact  Neuro: AOx3, no gross deficits  Mood: Calm, pleasant    Labs:                               11.2   3.13 )-----------( 208             33.8       141  |  107  |  8   ---------------------<  94  4.4   |   31   |  0.48    Ca 8.9   Phos 3.4   Mg 2.3    TPro  7.3  /  Alb  3.8  /  TBili  0.5  /  DBili  x   /  AST  12  /  ALT  12  /  AlkPhos  53      Lactate, Blood 1.2 mmol/L    Troponin (-)    Urinalysis Basic - ( 27 May 2022 19:38 )  Color: Yellow / Appearance: Clear / S.005 / pH: x  Gluc: x / Ketone: Trace  / Bili: Negative / Urobili: Negative   Blood: Trace / Protein: Negative / Nitrite: Negative   Leuk Esterase: Moderate / RBC: 0-2 /HPF / WBC 3-5   Sq Epi: x / Non Sq Epi: Occasional / Bacteria: Occasional    Micro:  Urine culture negative  Blood culture negative  COVID19 PCR negative    Imaging:  CXR : Lungs are clear. No large effusions or pneumothoraces. Cardiomediastinal silhouette is within normal limits. Osseous structures are unremarkable for age.    Cardiac Testing:  TTE : EA reversal of the mitral inflow consistent with reduced compliance of the left ventricle. The aortic valve is visualized, appears mildly sclerotic. Valve opening seems to be normal. Normal appearing tricuspid valve structure. Trace tricuspid valve regurgitation is present. Normal appearing left atrium. The left ventricle is normal in size, wall thickness, wall motion and contractility. Estimated left ventricular ejection fraction is 55 %. Normal appearing right atrium. Normal appearing right ventricle structure and function.    Tele : NSR, normal rate, no events    Tele : NSR, normal rate, then brief event of bradycardia with sinus pauses associated with near syncope, then reverted to NSR normal rate.     EKG : No acute ischemic changes. No dysrhythmia.    Meds:  MEDICATIONS  (STANDING):  acetaminophen     Tablet .. 975 milliGRAM(s) Oral every 8 hours  chlorhexidine 4% Liquid 1 Application(s) Topical <User Schedule>  clobetasol 0.05% Cream 1 Application(s) Topical <User Schedule>  enoxaparin Injectable 40 milliGRAM(s) SubCutaneous every 24 hours  hemorrhoidal Ointment 1 Application(s) Rectal daily  influenza   Vaccine 0.5 milliLiter(s) IntraMuscular once    MEDICATIONS  (PRN):  lidocaine 2% Gel 1 Application(s) Topical daily PRN vulvar pain prior to clobetasol  lidocaine 2% Gel 1 Application(s) Topical every 12 hours PRN pain  oxyCODONE    IR 5 milliGRAM(s) Oral every 4 hours PRN Moderate Pain (4 - 6)  oxyCODONE    IR 10 milliGRAM(s) Oral every 4 hours PRN Severe Pain (7 - 10)  phenazopyridine 200 milliGRAM(s) Oral three times a day PRN dysuria   Chief Complaint: Syncope, painful rash of the inner thighs and vulva    Interval History: No further episodes of near-syncope since her likely pain-triggered, vaso-vagal near-syncope she had in her hospital bed on . Vitals stable. Orthostatics negative. She continues to report vulva pain, 8/10, not relieved with current pain regimen. She remains apprehensive about placing topical clobetasol without adequate pain control. Patient seen by GYN, started on topical lidocaine jelly. Also, added acetaminophen around the clock and oxycodone prn with the oxycodone dosage increased today since she was continuing to report severe pain preventing her from applying the clobetasol. She is also now reporting painful hemorrhoids as well, confirmed on exam, ordered for hemorrhoidal ointment.     ROS: Multi system review is comprehensively negative x 10 systems except as above.     Vitals:  T(F): 99.2 (31 May 2022 08:30), Max: 99.2 (31 May 2022 08:30)  HR: 71 (31 May 2022 08:30) (65 - 71)  BP: 139/85 (31 May 2022 08:30) (127/82 - 139/85)  RR: 18 (31 May 2022 08:30) (18 - 19)  SpO2: 97% (31 May 2022 08:30) (96% - 97%)    Exam:  Gen: No acute distress  HEENT: NCAT PERRL EOMI clear oropharynx  Neck: Supple, no JVD  Chest: Normal resp effort at rest, lungs CTA B/L  CVS: S1 S2 normal RRR  Abd: Soft NT ND +BS  Pelvis: External genitalia w/ coalescing hypopigmented papules on the external labial bilaterally with similar hypopigmented papules on the bilateral inner thighs. Exquisite tenderness. No unusual discharge or vaginal bleeding. Anus with protruding slightly tender hemorrhoids. BERNARD not performed.   Ext: No edema or calf tenderness  Skin: Warm, dry, intact  Neuro: AOx3, no gross deficits  Mood: Calm, pleasant    Labs:                               11.2   3.13 )-----------( 208             33.8       141  |  107  |  8   ---------------------<  94  4.4   |   31   |  0.48    Ca 8.9   Phos 3.4   Mg 2.3    TPro  7.3  /  Alb  3.8  /  TBili  0.5  /  DBili  x   /  AST  12  /  ALT  12  /  AlkPhos  53      Lactate, Blood 1.2 mmol/L    Troponin (-)    Urinalysis Basic - ( 27 May 2022 19:38 )  Color: Yellow / Appearance: Clear / S.005 / pH: x  Gluc: x / Ketone: Trace  / Bili: Negative / Urobili: Negative   Blood: Trace / Protein: Negative / Nitrite: Negative   Leuk Esterase: Moderate / RBC: 0-2 /HPF / WBC 3-5   Sq Epi: x / Non Sq Epi: Occasional / Bacteria: Occasional    Micro:  Urine culture negative  Blood culture negative  COVID19 PCR negative    Imaging:  CXR : Lungs are clear. No large effusions or pneumothoraces. Cardiomediastinal silhouette is within normal limits. Osseous structures are unremarkable for age.    Cardiac Testing:  TTE : EA reversal of the mitral inflow consistent with reduced compliance of the left ventricle. The aortic valve is visualized, appears mildly sclerotic. Valve opening seems to be normal. Normal appearing tricuspid valve structure. Trace tricuspid valve regurgitation is present. Normal appearing left atrium. The left ventricle is normal in size, wall thickness, wall motion and contractility. Estimated left ventricular ejection fraction is 55 %. Normal appearing right atrium. Normal appearing right ventricle structure and function.    Tele : NSR, normal rate, no events    Tele : NSR, normal rate, then brief event of bradycardia with sinus pauses associated with near syncope, then reverted to NSR normal rate.     EKG : No acute ischemic changes. No dysrhythmia.    Meds:  MEDICATIONS  (STANDING):  acetaminophen     Tablet .. 975 milliGRAM(s) Oral every 8 hours  chlorhexidine 4% Liquid 1 Application(s) Topical <User Schedule>  clobetasol 0.05% Cream 1 Application(s) Topical <User Schedule>  enoxaparin Injectable 40 milliGRAM(s) SubCutaneous every 24 hours  hemorrhoidal Ointment 1 Application(s) Rectal daily  influenza   Vaccine 0.5 milliLiter(s) IntraMuscular once    MEDICATIONS  (PRN):  lidocaine 2% Gel 1 Application(s) Topical daily PRN vulvar pain prior to clobetasol  lidocaine 2% Gel 1 Application(s) Topical every 12 hours PRN pain  oxyCODONE    IR 5 milliGRAM(s) Oral every 4 hours PRN Moderate Pain (4 - 6)  oxyCODONE    IR 10 milliGRAM(s) Oral every 4 hours PRN Severe Pain (7 - 10)  phenazopyridine 200 milliGRAM(s) Oral three times a day PRN dysuria

## 2022-05-31 NOTE — PROGRESS NOTE ADULT - ASSESSMENT
61 year old woman with hx of migraine headaches, breast cancer, BRCA mutation, here for further evaluation after an episode of syncope.    5/30/22  suspect vasovagal syncope in the setting of a UTI and vaginal pain.   potassium supplementation.   stable from a cardiac perspective.     5/31/22  Syncope likely vasovagal.  No significant events on telemetry overnight.  EF preserved on echo without any significant valvular abnormalities.  No wall motion abnormalities.  Encourage hydration increase salt intake.  Will sign off.  Please reconsult as needed.

## 2022-05-31 NOTE — PROGRESS NOTE ADULT - SUBJECTIVE AND OBJECTIVE BOX
The patient was seen and examined. No acute events overnight.  No chest pain.  No shortness of breath.  No palpitations.  No events on tele.  Feels weak.    HPI: 61 year old woman with hx of migraine headaches, breast cancer, BRCA mutation, here for further evaluation after an episode of syncope, also requesting evaluation of severe vaginal pain and pruritis, ongoing for over a week. Rare whitish discharge. No fever. No new sexual partners. She does have some mild dysuria. No hematuria. No flank pain. No other complaints. No fever or rigors. No chest complaints.    (28 May 2022 09:03)           PAST MEDICAL & SURGICAL HISTORY:  Breast cancer      Migraine headache      BRCA Gene Positive      Lipoma of abdominal wall      S/P mastectomy, bilateral  2009 with silicone implants      S/P appendectomy        S/P  Section   and       Home Medications:  Imitrex 100 mg oral tablet: 1 tab(s) orally once, As Needed (27 May 2022 22:20)  Melatonin 12 mg oral tablet, disintegratin.5 tab(s) orally once a day (at bedtime), As Needed (27 May 2022 22:20)     Allergies    Dilaudid (Other)    Intolerances  Review of systems: A 10 point review of system has been performed, and is negative except for what has been mentioned in the above history of present illness.     MEDICATIONS  (STANDING):  acetaminophen     Tablet .. 975 milliGRAM(s) Oral every 8 hours  chlorhexidine 4% Liquid 1 Application(s) Topical <User Schedule>  clobetasol 0.05% Cream 1 Application(s) Topical <User Schedule>  enoxaparin Injectable 40 milliGRAM(s) SubCutaneous every 24 hours  hemorrhoidal Ointment 1 Application(s) Rectal daily  influenza   Vaccine 0.5 milliLiter(s) IntraMuscular once    MEDICATIONS  (PRN):  lidocaine 2% Gel 1 Application(s) Topical daily PRN vulvar pain prior to clobetasol  lidocaine 2% Gel 1 Application(s) Topical every 12 hours PRN pain  oxyCODONE    IR 5 milliGRAM(s) Oral every 4 hours PRN Moderate Pain (4 - 6)  oxyCODONE    IR 10 milliGRAM(s) Oral every 4 hours PRN Severe Pain (7 - 10)  phenazopyridine 200 milliGRAM(s) Oral three times a day PRN dysuria      Vital Signs Last 24 Hrs  T(C): 37.3 (31 May 2022 08:30), Max: 37.3 (31 May 2022 07:57)  T(F): 99.2 (31 May 2022 08:30), Max: 99.2 (31 May 2022 08:30)  HR: 71 (31 May 2022 08:30) (65 - 71)  BP: 139/85 (31 May 2022 08:30) (127/82 - 139/85)  BP(mean): 79 (31 May 2022 07:57) (79 - 79)  RR: 18 (31 May 2022 08:30) (18 - 19)  SpO2: 97% (31 May 2022 08:30) (96% - 97%)  I&O's Summary     PHYSICAL EXAM:       Constitutional: NAD, awake and alert, well-developed  HEENT: PERR, EOMI, Normal Hearing, MMM  Neck: Soft and supple, No LAD, No JVD  Respiratory: Breath sounds are clear bilaterally, No wheezing, rales or rhonchi  Cardiovascular: S1 and S2, regular rate and rhythm, no Murmurs, gallops or rubs  Gastrointestinal: Bowel Sounds present, soft, nontender, nondistended, no guarding, no rebound  Extremities: No peripheral edema  Vascular: 2+ peripheral pulses  Neurological: A/O x 3, no focal deficits  Musculoskeletal: 5/5 strength b/l upper and lower extremities  Skin: No rashes    LABS: All Labs Reviewed:                                            11.2   3.13  )-----------( 208      ( 31 May 2022 07:05 )             33.8          05-31    141  |  107  |  8   ----------------------------<  94  4.4   |  31  |  0.48<L>    Ca    8.9      31 May 2022 07:05  Phos  3.4     05-31  Mg     2.3     05-31                        Blood Culture: Organism --  Gram Stain Blood -- Gram Stain --  Specimen Source .Blood None  Culture-Blood --    Organism --  Gram Stain Blood -- Gram Stain --  Specimen Source .Blood None  Culture-Blood --    Organism --  Gram Stain Blood -- Gram Stain --  Specimen Source Clean Catch None  Culture-Blood --        RADIOLOGY:   < from: Xray Chest 1 View-PORTABLE IMMEDIATE (Xray Chest 1 View-PORTABLE IMMEDIATE .) (22 @ 21:06) >  ACC: [id number] EXAM:  XR CHEST PORTABLE IMMED 1V                          PROCEDURE DATE:  2022          INTERPRETATION:  Clinical Information:  Fever    Technique: AP chest image.    Comparison: No comparison    Findings/  Impression:    Lungs are clear.    No large effusions or pneumothoraces    Cardiomediastinal silhouette is within normal limits.    Osseous structures are unremarkable for age.    < end of copied text >  EKG: NSR, Normal EKG    CARDIOLOGY TESTING:  Echo 22   The mitral valve leaflets appear thickened.   EA reversal of the mitral inflow consistent with reduced compliance of   the   left ventricle.   The aortic valve is visualized, appears mildly sclerotic. Valve opening   seems to be normal.   Normal appearing tricuspid valve structure.   Trace tricuspid valve regurgitation is present.   Normal appearing left atrium.   The left ventricle is normal in size, wall thickness, wall motion and   contractility.   Estimated left ventricular ejection fraction is 55 %.   Normal appearing right atrium.   Normal appearing right ventricle structure and function.

## 2022-05-31 NOTE — PROGRESS NOTE ADULT - ASSESSMENT
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 On ROS, she reported pain with urination, B/L inner thigh and vulvar rash associated with itching and severe pain. UA only slightly abnormal with 5 WBCs, occ bacteria, N-, LE-. Patient was started on empiric IV ceftriaxone, given 2L IV fluid, and admitted to Medicine.     Syncope  Appears to be neurocardiogenic, vaso-vagal in setting of severe vulvar pain related to her rash (see below). Patient with recurrent episode while laying in hospital bed on 5/29, had a near-syncope preceded by nausea, lightheadedness, felt faint. She had concurrent profound hypotension and bradycardia, which all normalized after a minute or so. She was given aggressive IV fluid hydration and Cardiac EP was consulted. Suspect neurocardiogenic syncope. EP advised added salt to diet, maintaining adequate hydration, treating pain complaints and the underlying issue for that pain. Hgb stable. No sign of infection as urine culture actually returned negative. No further events on tele. TTE with preserved EF, possible diastolic dysfunction, no severe valvular disease. Orthostatics negative.    - Discussed increasing sodium intake and lifestyle modifications with pt/ changing position slowly/ avoiding dehydration triggers  - Encourage hydration, maintain K >4 and Mg > 2, avoid AV paramjit blocking agents    Vaginal pruritis and pain, suspected lichen sclerosis   Appreciate input from Gyn. Physical exam findings suspicious for lichen sclerosis. Ordered for clobetasol propionate nightly x 6-12 weeks. Patient counselled to establish Gyn care for continued surveillance and further outpatient work-up. Risk for development of squamous cell cancer. She is considering following up with her sister's gynecologist.   - Continue clobetasol  - Pain control (on topical lidocaine to vulva, pyridium, acetaminophen q8h, oxycodone prn breakthrough pain)  - Outpatient GYN follow up    Abnormal UA  Patient with discomfort with urination upon presentation. UA only slightly abnormal with 5 WBC, occ bacteria, N-, LE-. Started on empiric ceftriaxone and completed 3 days though on day 3, her urine culture finally returned negative. Patient with symptomatic relief from pyridium it seems.   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 On ROS, she reported pain with urination, B/L inner thigh and vulvar rash associated with itching and severe genital pain. UA only slightly abnormal with 5 WBCs, occ bacteria, N-, LE-. Patient was started on empiric IV ceftriaxone, given 2L IV fluid, and admitted to Medicine.     Syncope  Appears to have been neurocardiogenic, vaso-vagal in setting of severe vulva pain related to her rash (see below). Patient with recurrent episode with near-syncope while laying in hospital bed on 5/29. At that time she was endorsing pain, then developed nausea, lightheadedness, and felt faint. She had concurrent profound hypotension and bradycardia, which all normalized after a minute or so. She was given aggressive IV fluid hydration and Cardiac EP was consulted and suspected neurocardiogenic syncope / near-syncope. EP advised added salt to diet, maintaining adequate hydration, treating pain complaints and the underlying issue for that pain. Hgb stable. No sign of infection as urine culture actually returned negative. No further events on tele. TTE was performed, preserved EF, possible diastolic dysfunction, no severe valvular disease. Orthostatics negative.    - Discussed increasing sodium intake and lifestyle modifications with pt/ changing position slowly/ avoiding dehydration triggers  - Encourage hydration, maintain K >4 and Mg > 2, avoid AV paramjit blocking agents    Vaginal pruritis and pain, suspected lichen sclerosis   Appreciate input from Gyn. Physical exam findings suspicious for lichen sclerosis. Ordered for clobetasol propionate nightly x 6-12 weeks. Patient counselled to establish Gyn care for continued surveillance and further outpatient work-up. Risk for development of squamous cell cancer. She is considering following up with her sister's gynecologist.   - Continue clobetasol  - Pain control (on topical lidocaine to vulva, pyridium, acetaminophen q8h, oxycodone prn breakthrough pain)  - Outpatient GYN follow up    Abnormal UA  Patient with discomfort with urination upon presentation. UA only slightly abnormal with 5 WBC, occ bacteria, N-, LE-. Started on empiric ceftriaxone and completed 3 days though on day 3, her urine culture finally returned negative. Patient with symptomatic relief from pyridium it seems.   - Continue to monitor, continue with pyridium      RESOLVED HOSPITAL PROBLEMS    Hypokalemia  Resolved with potassium supplementation        Diet: Regular  DVTpx: LMWH  Code status: Full  Dispo: Return home with clinically improved, possibly 24 hrs

## 2022-06-01 ENCOUNTER — TRANSCRIPTION ENCOUNTER (OUTPATIENT)
Age: 62
End: 2022-06-01

## 2022-06-01 VITALS
SYSTOLIC BLOOD PRESSURE: 145 MMHG | OXYGEN SATURATION: 99 % | TEMPERATURE: 98 F | RESPIRATION RATE: 18 BRPM | HEART RATE: 62 BPM | DIASTOLIC BLOOD PRESSURE: 75 MMHG

## 2022-06-01 PROCEDURE — 99239 HOSP IP/OBS DSCHRG MGMT >30: CPT

## 2022-06-01 RX ORDER — OXYCODONE HYDROCHLORIDE 5 MG/1
1 TABLET ORAL
Qty: 0 | Refills: 0 | DISCHARGE
Start: 2022-06-01

## 2022-06-01 RX ORDER — LIDOCAINE 4 G/100G
1 CREAM TOPICAL
Qty: 0 | Refills: 0 | DISCHARGE
Start: 2022-06-01

## 2022-06-01 RX ORDER — HYDROCORTISONE 1 %
1 OINTMENT (GRAM) TOPICAL
Qty: 90 | Refills: 0
Start: 2022-06-01 | End: 2022-06-30

## 2022-06-01 RX ORDER — LIDOCAINE 4 G/100G
1 CREAM TOPICAL
Qty: 1 | Refills: 0
Start: 2022-06-01

## 2022-06-01 RX ORDER — OXYCODONE HYDROCHLORIDE 5 MG/1
1 TABLET ORAL
Qty: 18 | Refills: 0
Start: 2022-06-01 | End: 2022-06-03

## 2022-06-01 RX ORDER — ACETAMINOPHEN 500 MG
3 TABLET ORAL
Qty: 0 | Refills: 0 | DISCHARGE
Start: 2022-06-01

## 2022-06-01 RX ADMIN — Medication 975 MILLIGRAM(S): at 05:06

## 2022-06-01 RX ADMIN — Medication 975 MILLIGRAM(S): at 13:58

## 2022-06-01 RX ADMIN — OXYCODONE HYDROCHLORIDE 10 MILLIGRAM(S): 5 TABLET ORAL at 09:31

## 2022-06-01 RX ADMIN — PHENYLEPHRINE-SHARK LIVER OIL-MINERAL OIL-PETROLATUM RECTAL OINTMENT 1 APPLICATION(S): at 11:08

## 2022-06-01 RX ADMIN — CHLORHEXIDINE GLUCONATE 1 APPLICATION(S): 213 SOLUTION TOPICAL at 10:45

## 2022-06-01 NOTE — DISCHARGE NOTE PROVIDER - PROVIDER TOKENS
PROVIDER:[TOKEN:[[id number]],SCHEDULEDAPPT:[06/17/2022],SCHEDULEDAPPTTIME:[02:20 PM]],PROVIDER:[TOKEN:[[id number]],SCHEDULEDAPPT:[06/06/2022],SCHEDULEDAPPTTIME:[10:45 AM],ESTABLISHEDPATIENT:[T]]

## 2022-06-01 NOTE — DISCHARGE NOTE PROVIDER - NSDCPNSUBOBJ_GEN_ALL_CORE
Chief Complaint: Syncope, painful rash of the inner thighs and vulva    Interval History: No further episodes of near-syncope since her likely pain-triggered, vaso-vagal near-syncope she had in her hospital bed on . Vitals stable. Orthostatics negative. She continues to report vulva pain, 8/10, not relieved with current pain regimen. She remains apprehensive about placing topical clobetasol without adequate pain control. Patient seen by GYN, started on topical lidocaine jelly. Also, added acetaminophen around the clock and oxycodone prn with the oxycodone dosage increased today since she was continuing to report severe pain preventing her from applying the clobetasol. She is also now reporting painful hemorrhoids as well, confirmed on exam, ordered for hemorrhoidal ointment.     ROS: Multi system review is comprehensively negative x 10 systems except as above.     Vitals:  T(F): 99.2 (31 May 2022 08:30), Max: 99.2 (31 May 2022 08:30)  HR: 71 (31 May 2022 08:30) (65 - 71)  BP: 139/85 (31 May 2022 08:30) (127/82 - 139/85)  RR: 18 (31 May 2022 08:30) (18 - 19)  SpO2: 97% (31 May 2022 08:30) (96% - 97%)    Exam:  Gen: No acute distress  HEENT: NCAT PERRL EOMI clear oropharynx  Neck: Supple, no JVD  Chest: Normal resp effort at rest, lungs CTA B/L  CVS: S1 S2 normal RRR  Abd: Soft NT ND +BS  Pelvis: External genitalia w/ coalescing hypopigmented papules on the external labial bilaterally with similar hypopigmented papules on the bilateral inner thighs. Exquisite tenderness. No unusual discharge or vaginal bleeding. Anus with protruding slightly tender hemorrhoids. BERNARD not performed.   Ext: No edema or calf tenderness  Skin: Warm, dry, intact  Neuro: AOx3, no gross deficits  Mood: Calm, pleasant    Labs:                               11.2   3.13 )-----------( 208             33.8       141  |  107  |  8   ---------------------<  94  4.4   |   31   |  0.48    Ca 8.9   Phos 3.4   Mg 2.3    TPro  7.3  /  Alb  3.8  /  TBili  0.5  /  DBili  x   /  AST  12  /  ALT  12  /  AlkPhos  53      Lactate, Blood 1.2 mmol/L    Troponin (-)    Urinalysis Basic - ( 27 May 2022 19:38 )  Color: Yellow / Appearance: Clear / S.005 / pH: x  Gluc: x / Ketone: Trace  / Bili: Negative / Urobili: Negative   Blood: Trace / Protein: Negative / Nitrite: Negative   Leuk Esterase: Moderate / RBC: 0-2 /HPF / WBC 3-5   Sq Epi: x / Non Sq Epi: Occasional / Bacteria: Occasional    Micro:  Urine culture negative  Blood culture negative  COVID19 PCR negative    Imaging:  CXR : Lungs are clear. No large effusions or pneumothoraces. Cardiomediastinal silhouette is within normal limits. Osseous structures are unremarkable for age.    Cardiac Testing:  TTE : EA reversal of the mitral inflow consistent with reduced compliance of the left ventricle. The aortic valve is visualized, appears mildly sclerotic. Valve opening seems to be normal. Normal appearing tricuspid valve structure. Trace tricuspid valve regurgitation is present. Normal appearing left atrium. The left ventricle is normal in size, wall thickness, wall motion and contractility. Estimated left ventricular ejection fraction is 55 %. Normal appearing right atrium. Normal appearing right ventricle structure and function.    Tele : NSR, normal rate, no events    Tele : NSR, normal rate, then brief event of bradycardia with sinus pauses associated with near syncope, then reverted to NSR normal rate.     EKG : No acute ischemic changes. No dysrhythmia.    Meds:  MEDICATIONS  (STANDING):  acetaminophen     Tablet .. 975 milliGRAM(s) Oral every 8 hours  chlorhexidine 4% Liquid 1 Application(s) Topical <User Schedule>  clobetasol 0.05% Cream 1 Application(s) Topical <User Schedule>  enoxaparin Injectable 40 milliGRAM(s) SubCutaneous every 24 hours  hemorrhoidal Ointment 1 Application(s) Rectal daily  influenza   Vaccine 0.5 milliLiter(s) IntraMuscular once    MEDICATIONS  (PRN):  lidocaine 2% Gel 1 Application(s) Topical daily PRN vulvar pain prior to clobetasol  lidocaine 2% Gel 1 Application(s) Topical every 12 hours PRN pain  oxyCODONE    IR 5 milliGRAM(s) Oral every 4 hours PRN Moderate Pain (4 - 6)  oxyCODONE    IR 10 milliGRAM(s) Oral every 4 hours PRN Severe Pain (7 - 10)  phenazopyridine 200 milliGRAM(s) Oral three times a day PRN dysuria      Assessment and Plan:   · Assessment    61 year-old woman with hx of migraine headaches, 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 On ROS, she reported pain with urination, B/L inner thigh and vulvar rash associated with itching and severe genital pain. UA only slightly abnormal with 5 WBCs, occ bacteria, N-, LE-. Patient was started on empiric IV ceftriaxone, given 2L IV fluid, and admitted to Medicine.     Syncope  Appears to have been neurocardiogenic, vaso-vagal in setting of severe vulva pain related to her rash (see below). Patient with recurrent episode with near-syncope while laying in hospital bed on . At that time she was endorsing pain, then developed nausea, lightheadedness, and felt faint. She had concurrent profound hypotension and bradycardia, which all normalized after a minute or so. She was given aggressive IV fluid hydration and Cardiac EP was consulted and suspected neurocardiogenic syncope / near-syncope. EP advised added salt to diet, maintaining adequate hydration, treating pain complaints and the underlying issue for that pain. Hgb stable. No sign of infection as urine culture actually returned negative. No further events on tele. TTE was performed, preserved EF, possible diastolic dysfunction, no severe valvular disease. Orthostatics negative.    - Discussed increasing sodium intake and lifestyle modifications with pt/ changing position slowly/ avoiding dehydration triggers  - Encourage hydration, maintain K >4 and Mg > 2, avoid AV paramjit blocking agents    Vaginal pruritis and pain, suspected lichen sclerosis   Appreciate input from Gyn. Physical exam findings suspicious for lichen sclerosis. Ordered for clobetasol propionate nightly x 6-12 weeks. Patient counselled to establish Gyn care for continued surveillance and further outpatient work-up. Risk for development of squamous cell cancer. She is considering following up with her sister's gynecologist.   - Continue clobetasol  - Pain control (on topical lidocaine to vulva, pyridium, acetaminophen q8h, oxycodone prn breakthrough pain)  - Outpatient GYN follow up    Abnormal UA  Patient with discomfort with urination upon presentation. UA only slightly abnormal with 5 WBC, occ bacteria, N-, LE-. Started on empiric ceftriaxone and completed 3 days though on day 3, her urine culture finally returned negative. Patient with symptomatic relief from pyridium it seems.   - Continue to monitor, continue with pyridium      RESOLVED HOSPITAL PROBLEMS    Hypokalemia  Resolved with potassium supplementation        Diet: Regular  DVTpx: LMWH  Code status: Full  Dispo: Return home with clinically improved, possibly 24 hrs Chief Complaint: Syncope, painful rash of the inner thighs and vulva    Interval History: No further episodes of near-syncope since her likely pain-triggered, vaso-vagal near-syncope she had in her hospital bed on . Vitals stable. Orthostatics negative. She continues to report vulva pain  She is also now reporting painful hemorrhoids as well, confirmed on exam, ordered for hemorrhoidal ointment.     ROS: Multi system review is comprehensively negative x 10 systems except as above.     Vitals:  T(C): 36.9 (2022 08:34), Max: 36.9 (2022 08:34)  T(F): 98.5 (2022 08:34), Max: 98.5 (2022 08:34)  HR: 62 (2022 08:34) (62 - 67)  BP: 145/75 (2022 08:34) (118/68 - 145/75)  RR: 18 (2022 08:34) (17 - 18)  SpO2: 99% (2022 08:34) (98% - 99%)    Exam:  Gen: No acute distress  HEENT: NCAT PERRL EOMI clear oropharynx  Neck: Supple, no JVD  Chest: Normal resp effort at rest, lungs CTA B/L  CVS: S1 S2 normal RRR  Abd: Soft NT ND +BS  Pelvis: External genitalia w/ coalescing hypopigmented papules on the external labial bilaterally with similar hypopigmented papules on the bilateral inner thighs. Exquisite tenderness. No unusual discharge or vaginal bleeding. Anus with protruding slightly tender hemorrhoids. BERNARD not performed.   Ext: No edema or calf tenderness  Skin: Warm, dry, intact  Neuro: AOx3, no gross deficits  Mood: Calm, pleasant    Labs:                       11.2   3.13  )-----------( 208      ( 31 May 2022 07:05 )             33.8     05-31    141  |  107  |  8   ----------------------------<  94  4.4   |  31  |  0.48<L>    Ca    8.9      31 May 2022 07:05  Phos  3.4     05-31  Mg     2.3     05-31    Troponin: 3.43 ng/L, Troponin: 5.29 ng/L    Lactate, Blood 1.2 mmol/L    Troponin (-)    Urinalysis Basic - ( 27 May 2022 19:38 )  Color: Yellow / Appearance: Clear / S.005 / pH: x  Gluc: x / Ketone: Trace  / Bili: Negative / Urobili: Negative   Blood: Trace / Protein: Negative / Nitrite: Negative   Leuk Esterase: Moderate / RBC: 0-2 /HPF / WBC 3-5   Sq Epi: x / Non Sq Epi: Occasional / Bacteria: Occasional    Micro:  Urine culture negative  Blood culture negative  COVID19 PCR negative    Imaging:  CXR : Lungs are clear. No large effusions or pneumothoraces. Cardiomediastinal silhouette is within normal limits. Osseous structures are unremarkable for age.    Cardiac Testing:  TTE : EA reversal of the mitral inflow consistent with reduced compliance of the left ventricle. The aortic valve is visualized, appears mildly sclerotic. Valve opening seems to be normal. Normal appearing tricuspid valve structure. Trace tricuspid valve regurgitation is present. Normal appearing left atrium. The left ventricle is normal in size, wall thickness, wall motion and contractility. Estimated left ventricular ejection fraction is 55 %. Normal appearing right atrium. Normal appearing right ventricle structure and function.    Tele : NSR, normal rate, no events    Tele : NSR, normal rate, then brief event of bradycardia with sinus pauses associated with near syncope, then reverted to NSR normal rate.     EKG : No acute ischemic changes. No dysrhythmia.    Meds:  acetaminophen     Tablet .. 975 milliGRAM(s) Oral every 8 hours  chlorhexidine 4% Liquid 1 Application(s) Topical <User Schedule>  clobetasol 0.05% Cream 1 Application(s) Topical <User Schedule>  enoxaparin Injectable 40 milliGRAM(s) SubCutaneous every 24 hours  hemorrhoidal Ointment 1 Application(s) Rectal daily  influenza   Vaccine 0.5 milliLiter(s) IntraMuscular once    MEDICATIONS  (PRN):  lidocaine 2% Gel 1 Application(s) Topical daily PRN vulvar pain prior to clobetasol  lidocaine 2% Gel 1 Application(s) Topical every 12 hours PRN pain  oxyCODONE    IR 5 milliGRAM(s) Oral every 4 hours PRN Moderate Pain (4 - 6)  oxyCODONE    IR 10 milliGRAM(s) Oral every 4 hours PRN Severe Pain (7 - 10)  phenazopyridine 200 milliGRAM(s) Oral three times a day PRN dysuria      Assessment and Plan:     61 year-old woman with hx of migraine headaches, 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 On ROS, she reported pain with urination, B/L inner thigh and vulvar rash associated with itching and severe genital pain. UA only slightly abnormal with 5 WBCs, occ bacteria, N-, LE-. Patient was started on empiric IV ceftriaxone, given 2L IV fluid, and admitted to Medicine.     Syncope  Appears to have been neurocardiogenic, vaso-vagal in setting of severe vulva pain related to her rash (see below). Patient with recurrent episode with near-syncope while laying in hospital bed on . At that time she was endorsing pain, then developed nausea, lightheadedness, and felt faint. She had concurrent profound hypotension and bradycardia, which all normalized after a minute or so. She was given aggressive IV fluid hydration and Cardiac EP was consulted and suspected neurocardiogenic syncope / near-syncope. EP advised added salt to diet, maintaining adequate hydration, treating pain complaints and the underlying issue for that pain. Hgb stable. No sign of infection as urine culture actually returned negative. No further events on tele. TTE was performed, preserved EF, possible diastolic dysfunction, no severe valvular disease. Orthostatics negative.    - Discussed increasing sodium intake and lifestyle modifications with pt/ changing position slowly/ avoiding dehydration triggers  - Encourage hydration, maintain K >4 and Mg > 2, avoid AV paramjit blocking agents    Vaginal pruritis and pain, suspected lichen sclerosis   Appreciate input from Gyn. Physical exam findings suspicious for lichen sclerosis. Ordered for clobetasol propionate nightly x 6-12 weeks. Patient counselled to establish Gyn care for continued surveillance and further outpatient work-up. Risk for development of squamous cell cancer. She is considering following up with her sister's gynecologist.   - Continue clobetasol  - Pain control (on topical lidocaine to vulva, pyridium, acetaminophen q8h, oxycodone prn breakthrough pain)  - Outpatient GYN follow up    Abnormal UA  Patient with discomfort with urination upon presentation. UA only slightly abnormal with 5 WBC, occ bacteria, N-, LE-. Started on empiric ceftriaxone and completed 3 days though on day 3, her urine culture finally returned negative. Patient with symptomatic relief from pyridium it seems.   - Continue to monitor, continue with pyridium    RESOLVED HOSPITAL PROBLEMS    Hypokalemia  Resolved with potassium supplementation    Diet: Regular  DVTpx: LMWH  Code status: Full  Dispo: Return home. Follow up appointments made with YULIANA Chinchilla on  at 10:45AM and Shannan Smith GYN for  @220pm.

## 2022-06-01 NOTE — DISCHARGE NOTE PROVIDER - CARE PROVIDERS DIRECT ADDRESSES
[email or web address]

## 2022-06-01 NOTE — DISCHARGE NOTE PROVIDER - NSDCCPCAREPLAN_GEN_ALL_CORE_FT
PRINCIPAL DISCHARGE DIAGNOSIS  Diagnosis: Syncope  Assessment and Plan of Treatment: Syncope. Appears to have been neurocardiogenic, vaso-vagal in setting of severe vulva pain related to her rash (see below). Patient with recurrent episode with near-syncope while laying in hospital bed on 5/29.   - Hgb stable. No sign of infection - urine culture negative. No further events on telemetry. TTE was performed, preserved EF, no severe valvular disease.   - Orthostatics negative.    - Discussed increasing sodium intake and lifestyle modifications with pt/ changing position slowly/ avoiding dehydration triggers  - Continue with PO hydration      SECONDARY DISCHARGE DIAGNOSES  Diagnosis: Lichen sclerosus  Assessment and Plan of Treatment: Vaginal pruritis and pain, suspected lichen sclerosis   Appreciate input from Gyn. Physical exam findings suspicious for lichen sclerosis. Ordered for clobetasol propionate nightly x 6-12 weeks. Patient counselled to establish   - Continue clobetasol  - Pain control (on topical lidocaine to vulva, acetaminophen q8h, oxycodone prn breakthrough pain)  - Outpatient GYN follow up -- Appointment made on 6/17 for follow up w/ Dr. Ulloa

## 2022-06-01 NOTE — DISCHARGE NOTE PROVIDER - CARE PROVIDER_API CALL
No risk alerts present
Shannan Ulloa (DO)  Obstetrics and Gynecology  [address] [facility], [address] [place] [address]  Phone: [phone]  Fax: [phone]  Scheduled Appointment: 06/17/2022 02:20 PM    Essence Chinchilla)  Internal Medicine  [address] [place] [address]  Phone: [phone]  Fax: [phone]  Established Patient  Scheduled Appointment: 06/06/2022 10:45 AM

## 2022-06-01 NOTE — DISCHARGE NOTE NURSING/CASE MANAGEMENT/SOCIAL WORK - NSDCPEFALRISK_GEN_ALL_CORE
For information on Fall & Injury Prevention, visit: [email or web address] OR  [email or web address] OR  https://www.cdc.gov/steadi/patient.html

## 2022-06-01 NOTE — DISCHARGE NOTE PROVIDER - ATTENDING DISCHARGE PHYSICAL EXAMINATION:
T(C): 36.9 (06-01-22 @ 08:34), Max: 36.9 (06-01-22 @ 08:34)  HR: 62 (06-01-22 @ 08:34) (62 - 67)  BP: 145/75 (06-01-22 @ 08:34) (118/68 - 145/75)  RR: 18 (06-01-22 @ 08:34) (17 - 18)  SpO2: 99% (06-01-22 @ 08:34) (98% - 99%)  AAOx3; NAD  RRR; No murmur; No edema  Lungs CTA bilaterally; Normal respiratory effort  Skin: As described on NP exam

## 2022-06-01 NOTE — DISCHARGE NOTE PROVIDER - HOSPITAL COURSE
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 On ROS, she reported pain with urination, B/L inner thigh and vulvar rash associated with itching and severe genital pain. UA only slightly abnormal with 5 WBCs, occ bacteria, N-, LE-. Patient was started on empiric IV ceftriaxone, given 2L IV fluid, and admitted to Medicine.     Syncope  Appears to have been neurocardiogenic, vaso-vagal in setting of severe vulva pain related to her rash (see below). Patient with recurrent episode with near-syncope while laying in hospital bed on 5/29. At that time she was endorsing pain, then developed nausea, lightheadedness, and felt faint. She had concurrent profound hypotension and bradycardia, which all normalized after a minute or so. She was given aggressive IV fluid hydration and Cardiac EP was consulted and suspected neurocardiogenic syncope / near-syncope. EP advised added salt to diet, maintaining adequate hydration, treating pain complaints and the underlying issue for that pain. Hgb stable. No sign of infection as urine culture actually returned negative. No further events on tele. TTE was performed, preserved EF, possible diastolic dysfunction, no severe valvular disease. Orthostatics negative.    - Discussed increasing sodium intake and lifestyle modifications with pt/ changing position slowly/ avoiding dehydration triggers  - Encourage hydration, maintain K >4 and Mg > 2, avoid AV paramjit blocking agents    Vaginal pruritis and pain, suspected lichen sclerosis   Appreciate input from Gyn. Physical exam findings suspicious for lichen sclerosis. Ordered for clobetasol propionate nightly x 6-12 weeks. Patient counselled to establish Gyn care for continued surveillance and further outpatient work-up. Risk for development of squamous cell cancer.   - Continue clobetasol  - Pain control (on topical lidocaine to vulva, pyridium, acetaminophen q8h, oxycodone prn breakthrough pain)  - Outpatient GYN follow up -- Appointment made on 6/17 for follow up w/ Dr. Ulloa    Abnormal UA  Patient with discomfort with urination upon presentation. UA only slightly abnormal with 5 WBC, occ bacteria, N-, LE-. Started on empiric ceftriaxone and completed 3 days though on day 3, her urine culture finally returned negative. Advised if persisting to follow up see PCP, appointment made on 6/6.    RESOLVED HOSPITAL PROBLEMS    Hypokalemia  Resolved with potassium supplementation    Diet: Regular  DVTpx: LMWH  Code status: Full  Dispo: Return home. Follow up appointments made with YULIANA Chinchilla on 6/6 at 10:45AM and Shannan RAMOS for 6/17 @220pm.    Dispo: discharge to *HOME* in stable condition    Final diagnosis, treatment plan, and follow-up recommendations were discussed and explained to the patient. The patient was given an opportunity to ask questions concerning the diagnosis and treatment plan. The patient acknowledged understanding of the diagnosis, treatment, and follow-up recommendations. The patient was advised to seek urgent care upon discharge if worsening symptoms develop prior to scheduled follow-up. Time spent on discharge included time with the patient, and also coordinating discharge care as outlined below.    Total time spent: 50 min

## 2022-06-01 NOTE — DISCHARGE NOTE PROVIDER - NSDCMRMEDTOKEN_GEN_ALL_CORE_FT
Imitrex 100 mg oral tablet: 1 tab(s) orally once, As Needed  Melatonin 12 mg oral tablet, disintegratin.5 tab(s) orally once a day (at bedtime), As Needed   acetaminophen 325 mg oral tablet: 3 tab(s) orally every 8 hours  Anucort-HC 25 mg rectal suppository: 1 suppository(ies) rectally 3 times a day   clobetasol 0.05% topical cream: Apply topically to affected area once a day   Imitrex 100 mg oral tablet: 1 tab(s) orally once, As Needed  lidocaine 2% topical gel with applicator: 1 application topically every 12 hours, As needed, pain  lidocaine 2% topical gel with applicator: 1 application topically once a day, As needed, vulvar pain prior to clobetasol  Melatonin 12 mg oral tablet, disintegratin.5 tab(s) orally once a day (at bedtime), As Needed  oxyCODONE 10 mg oral tablet: 1 tab(s) orally every 4 hours, As needed, Severe Pain (7 - 10)  oxyCODONE 5 mg oral tablet: 1 tab(s) orally every 4 hours, As needed, Moderate Pain (4 - 6) MDD:6 tabs per day  Patient has been hospitalized from 22 to 2022 at [facility]. Patient to follow up with primary care provider for medical clearance to return to work full duty. No driving or return to work until cleared by primary care provider.:

## 2022-06-01 NOTE — DISCHARGE NOTE NURSING/CASE MANAGEMENT/SOCIAL WORK - PATIENT PORTAL LINK FT
You can access the FollowMyHealth Patient Portal offered by [facility] by registering at the following website: [email or web address]. By joining [facility]’s FollowMyHealth portal, you will also be able to view your health information using other applications (apps) compatible with our system.

## 2022-06-02 LAB
CULTURE RESULTS: SIGNIFICANT CHANGE UP
CULTURE RESULTS: SIGNIFICANT CHANGE UP
SPECIMEN SOURCE: SIGNIFICANT CHANGE UP
SPECIMEN SOURCE: SIGNIFICANT CHANGE UP

## 2022-06-07 DIAGNOSIS — K64.9 UNSPECIFIED HEMORRHOIDS: ICD-10-CM

## 2022-06-07 DIAGNOSIS — N90.4 LEUKOPLAKIA OF VULVA: ICD-10-CM

## 2022-06-07 DIAGNOSIS — L29.2 PRURITUS VULVAE: ICD-10-CM

## 2022-06-07 DIAGNOSIS — E87.6 HYPOKALEMIA: ICD-10-CM

## 2022-06-07 DIAGNOSIS — G43.909 MIGRAINE, UNSPECIFIED, NOT INTRACTABLE, WITHOUT STATUS MIGRAINOSUS: ICD-10-CM

## 2022-06-07 DIAGNOSIS — Z85.3 PERSONAL HISTORY OF MALIGNANT NEOPLASM OF BREAST: ICD-10-CM

## 2022-06-07 DIAGNOSIS — N39.0 URINARY TRACT INFECTION, SITE NOT SPECIFIED: ICD-10-CM

## 2022-06-07 DIAGNOSIS — R10.2 PELVIC AND PERINEAL PAIN: ICD-10-CM

## 2022-06-07 DIAGNOSIS — R55 SYNCOPE AND COLLAPSE: ICD-10-CM
